# Patient Record
Sex: FEMALE | Race: WHITE | NOT HISPANIC OR LATINO | Employment: FULL TIME | URBAN - METROPOLITAN AREA
[De-identification: names, ages, dates, MRNs, and addresses within clinical notes are randomized per-mention and may not be internally consistent; named-entity substitution may affect disease eponyms.]

---

## 2019-12-29 ENCOUNTER — APPOINTMENT (OUTPATIENT)
Dept: CT IMAGING | Facility: CLINIC | Age: 24
DRG: 189 | End: 2019-12-29
Attending: INTERNAL MEDICINE
Payer: COMMERCIAL

## 2019-12-29 ENCOUNTER — APPOINTMENT (OUTPATIENT)
Dept: GENERAL RADIOLOGY | Facility: CLINIC | Age: 24
DRG: 189 | End: 2019-12-29
Attending: INTERNAL MEDICINE
Payer: COMMERCIAL

## 2019-12-29 ENCOUNTER — HOSPITAL ENCOUNTER (INPATIENT)
Facility: CLINIC | Age: 24
LOS: 1 days | Discharge: HOME | DRG: 189 | End: 2020-01-01
Attending: INTERNAL MEDICINE | Admitting: EMERGENCY MEDICINE
Payer: COMMERCIAL

## 2019-12-29 DIAGNOSIS — J96.01 ACUTE RESPIRATORY FAILURE WITH HYPOXIA (H): Primary | ICD-10-CM

## 2019-12-29 DIAGNOSIS — J21.0 RSV BRONCHIOLITIS: ICD-10-CM

## 2019-12-29 LAB
ANION GAP SERPL CALCULATED.3IONS-SCNC: 3 MMOL/L (ref 3–14)
BASOPHILS # BLD AUTO: 0.1 10E9/L (ref 0–0.2)
BASOPHILS NFR BLD AUTO: 0.6 %
BUN SERPL-MCNC: 10 MG/DL (ref 7–30)
CALCIUM SERPL-MCNC: 8.8 MG/DL (ref 8.5–10.1)
CHLORIDE SERPL-SCNC: 107 MMOL/L (ref 94–109)
CO2 SERPL-SCNC: 27 MMOL/L (ref 20–32)
CREAT SERPL-MCNC: 0.67 MG/DL (ref 0.52–1.04)
CRP SERPL-MCNC: 85 MG/L (ref 0–8)
D DIMER PPP FEU-MCNC: 1.2 UG/ML FEU (ref 0–0.5)
DIFFERENTIAL METHOD BLD: ABNORMAL
DIFFERENTIAL METHOD BLD: NORMAL
EOSINOPHIL # BLD AUTO: 0.4 10E9/L (ref 0–0.7)
EOSINOPHIL NFR BLD AUTO: 4 %
ERYTHROCYTE [DISTWIDTH] IN BLOOD BY AUTOMATED COUNT: 14.5 % (ref 10–15)
ERYTHROCYTE [DISTWIDTH] IN BLOOD BY AUTOMATED COUNT: NORMAL % (ref 10–15)
FLUAV+FLUBV RNA SPEC QL NAA+PROBE: NEGATIVE
FLUAV+FLUBV RNA SPEC QL NAA+PROBE: NEGATIVE
GFR SERPL CREATININE-BSD FRML MDRD: >90 ML/MIN/{1.73_M2}
GLUCOSE SERPL-MCNC: 75 MG/DL (ref 70–99)
HCG SERPL QL: NEGATIVE
HCT VFR BLD AUTO: 35.1 % (ref 35–47)
HCT VFR BLD AUTO: NORMAL % (ref 35–47)
HGB BLD-MCNC: 10.8 G/DL (ref 11.7–15.7)
HGB BLD-MCNC: NORMAL G/DL (ref 11.7–15.7)
IMM GRANULOCYTES # BLD: 0 10E9/L (ref 0–0.4)
IMM GRANULOCYTES NFR BLD: 0.3 %
LACTATE BLD-SCNC: 1.1 MMOL/L (ref 0.7–2)
LYMPHOCYTES # BLD AUTO: 2.8 10E9/L (ref 0.8–5.3)
LYMPHOCYTES NFR BLD AUTO: 27.3 %
MCH RBC QN AUTO: 26.3 PG (ref 26.5–33)
MCH RBC QN AUTO: NORMAL PG (ref 26.5–33)
MCHC RBC AUTO-ENTMCNC: 30.8 G/DL (ref 31.5–36.5)
MCHC RBC AUTO-ENTMCNC: NORMAL G/DL (ref 31.5–36.5)
MCV RBC AUTO: 85 FL (ref 78–100)
MCV RBC AUTO: NORMAL FL (ref 78–100)
MONOCYTES # BLD AUTO: 0.8 10E9/L (ref 0–1.3)
MONOCYTES NFR BLD AUTO: 8 %
NEUTROPHILS # BLD AUTO: 6 10E9/L (ref 1.6–8.3)
NEUTROPHILS NFR BLD AUTO: 59.8 %
NRBC # BLD AUTO: 0 10*3/UL
NRBC BLD AUTO-RTO: 0 /100
PLATELET # BLD AUTO: 226 10E9/L (ref 150–450)
PLATELET # BLD AUTO: NORMAL 10E9/L (ref 150–450)
POTASSIUM SERPL-SCNC: 4.2 MMOL/L (ref 3.4–5.3)
RBC # BLD AUTO: 4.11 10E12/L (ref 3.8–5.2)
RBC # BLD AUTO: NORMAL 10E12/L (ref 3.8–5.2)
RSV RNA SPEC NAA+PROBE: POSITIVE
SODIUM SERPL-SCNC: 137 MMOL/L (ref 133–144)
SPECIMEN SOURCE: ABNORMAL
WBC # BLD AUTO: 10.1 10E9/L (ref 4–11)
WBC # BLD AUTO: NORMAL 10E9/L (ref 4–11)

## 2019-12-29 PROCEDURE — 94640 AIRWAY INHALATION TREATMENT: CPT

## 2019-12-29 PROCEDURE — 83605 ASSAY OF LACTIC ACID: CPT | Performed by: PHYSICIAN ASSISTANT

## 2019-12-29 PROCEDURE — 25000128 H RX IP 250 OP 636: Performed by: INTERNAL MEDICINE

## 2019-12-29 PROCEDURE — 40000141 ZZH STATISTIC PERIPHERAL IV START W/O US GUIDANCE

## 2019-12-29 PROCEDURE — 25000132 ZZH RX MED GY IP 250 OP 250 PS 637: Performed by: PHYSICIAN ASSISTANT

## 2019-12-29 PROCEDURE — 99220 ZZC INITIAL OBSERVATION CARE,LEVL III: CPT | Mod: Z6 | Performed by: PHYSICIAN ASSISTANT

## 2019-12-29 PROCEDURE — 94640 AIRWAY INHALATION TREATMENT: CPT | Performed by: INTERNAL MEDICINE

## 2019-12-29 PROCEDURE — 40000275 ZZH STATISTIC RCP TIME EA 10 MIN

## 2019-12-29 PROCEDURE — 36415 COLL VENOUS BLD VENIPUNCTURE: CPT | Performed by: PHYSICIAN ASSISTANT

## 2019-12-29 PROCEDURE — 96375 TX/PRO/DX INJ NEW DRUG ADDON: CPT

## 2019-12-29 PROCEDURE — 87040 BLOOD CULTURE FOR BACTERIA: CPT | Performed by: INTERNAL MEDICINE

## 2019-12-29 PROCEDURE — 71275 CT ANGIOGRAPHY CHEST: CPT

## 2019-12-29 PROCEDURE — 25000128 H RX IP 250 OP 636: Performed by: PHYSICIAN ASSISTANT

## 2019-12-29 PROCEDURE — 96374 THER/PROPH/DIAG INJ IV PUSH: CPT | Mod: 59 | Performed by: INTERNAL MEDICINE

## 2019-12-29 PROCEDURE — 80048 BASIC METABOLIC PNL TOTAL CA: CPT | Performed by: INTERNAL MEDICINE

## 2019-12-29 PROCEDURE — 25000125 ZZHC RX 250: Performed by: PHYSICIAN ASSISTANT

## 2019-12-29 PROCEDURE — 25000125 ZZHC RX 250: Performed by: INTERNAL MEDICINE

## 2019-12-29 PROCEDURE — 71046 X-RAY EXAM CHEST 2 VIEWS: CPT

## 2019-12-29 PROCEDURE — 86140 C-REACTIVE PROTEIN: CPT | Performed by: INTERNAL MEDICINE

## 2019-12-29 PROCEDURE — 25000128 H RX IP 250 OP 636: Performed by: STUDENT IN AN ORGANIZED HEALTH CARE EDUCATION/TRAINING PROGRAM

## 2019-12-29 PROCEDURE — 87631 RESP VIRUS 3-5 TARGETS: CPT | Performed by: INTERNAL MEDICINE

## 2019-12-29 PROCEDURE — 85025 COMPLETE CBC W/AUTO DIFF WBC: CPT | Performed by: INTERNAL MEDICINE

## 2019-12-29 PROCEDURE — 85379 FIBRIN DEGRADATION QUANT: CPT | Performed by: INTERNAL MEDICINE

## 2019-12-29 PROCEDURE — 84703 CHORIONIC GONADOTROPIN ASSAY: CPT | Performed by: INTERNAL MEDICINE

## 2019-12-29 PROCEDURE — G0378 HOSPITAL OBSERVATION PER HR: HCPCS

## 2019-12-29 PROCEDURE — 25800030 ZZH RX IP 258 OP 636: Performed by: PHYSICIAN ASSISTANT

## 2019-12-29 PROCEDURE — 99285 EMERGENCY DEPT VISIT HI MDM: CPT | Mod: 25 | Performed by: INTERNAL MEDICINE

## 2019-12-29 RX ORDER — ALBUTEROL SULFATE 0.83 MG/ML
2.5 SOLUTION RESPIRATORY (INHALATION)
Status: DISCONTINUED | OUTPATIENT
Start: 2019-12-29 | End: 2019-12-30

## 2019-12-29 RX ORDER — IPRATROPIUM BROMIDE AND ALBUTEROL SULFATE 2.5; .5 MG/3ML; MG/3ML
3 SOLUTION RESPIRATORY (INHALATION) EVERY 4 HOURS
Status: DISCONTINUED | OUTPATIENT
Start: 2019-12-29 | End: 2019-12-30

## 2019-12-29 RX ORDER — ACETYLCYSTEINE 100 MG/ML
4 SOLUTION ORAL; RESPIRATORY (INHALATION) EVERY 4 HOURS
Status: DISCONTINUED | OUTPATIENT
Start: 2019-12-29 | End: 2019-12-30

## 2019-12-29 RX ORDER — METHYLPREDNISOLONE SODIUM SUCCINATE 125 MG/2ML
60 INJECTION, POWDER, LYOPHILIZED, FOR SOLUTION INTRAMUSCULAR; INTRAVENOUS ONCE
Status: DISCONTINUED | OUTPATIENT
Start: 2019-12-30 | End: 2019-12-29

## 2019-12-29 RX ORDER — CEFTRIAXONE 1 G/1
1 INJECTION, POWDER, FOR SOLUTION INTRAMUSCULAR; INTRAVENOUS EVERY 24 HOURS
Status: DISCONTINUED | OUTPATIENT
Start: 2019-12-29 | End: 2019-12-30

## 2019-12-29 RX ORDER — METHYLPREDNISOLONE SODIUM SUCCINATE 125 MG/2ML
40 INJECTION, POWDER, LYOPHILIZED, FOR SOLUTION INTRAMUSCULAR; INTRAVENOUS ONCE
Status: COMPLETED | OUTPATIENT
Start: 2019-12-29 | End: 2019-12-29

## 2019-12-29 RX ORDER — IOPAMIDOL 755 MG/ML
77 INJECTION, SOLUTION INTRAVASCULAR ONCE
Status: COMPLETED | OUTPATIENT
Start: 2019-12-29 | End: 2019-12-29

## 2019-12-29 RX ORDER — PREDNISONE 20 MG/1
40 TABLET ORAL DAILY
Status: DISCONTINUED | OUTPATIENT
Start: 2019-12-30 | End: 2020-01-01 | Stop reason: HOSPADM

## 2019-12-29 RX ORDER — AZITHROMYCIN 250 MG/1
500 TABLET, FILM COATED ORAL ONCE
Status: COMPLETED | OUTPATIENT
Start: 2019-12-29 | End: 2019-12-29

## 2019-12-29 RX ORDER — AZITHROMYCIN 250 MG/1
250 TABLET, FILM COATED ORAL DAILY
Status: DISCONTINUED | OUTPATIENT
Start: 2019-12-30 | End: 2019-12-30

## 2019-12-29 RX ORDER — ACETAMINOPHEN 325 MG/1
650 TABLET ORAL EVERY 4 HOURS PRN
Status: DISCONTINUED | OUTPATIENT
Start: 2019-12-29 | End: 2020-01-01 | Stop reason: HOSPADM

## 2019-12-29 RX ORDER — ONDANSETRON 4 MG/1
4 TABLET, ORALLY DISINTEGRATING ORAL EVERY 6 HOURS PRN
Status: DISCONTINUED | OUTPATIENT
Start: 2019-12-29 | End: 2020-01-01 | Stop reason: HOSPADM

## 2019-12-29 RX ORDER — METHYLPREDNISOLONE SODIUM SUCCINATE 125 MG/2ML
80 INJECTION, POWDER, LYOPHILIZED, FOR SOLUTION INTRAMUSCULAR; INTRAVENOUS ONCE
Status: DISCONTINUED | OUTPATIENT
Start: 2019-12-29 | End: 2019-12-29

## 2019-12-29 RX ORDER — ONDANSETRON 2 MG/ML
4 INJECTION INTRAMUSCULAR; INTRAVENOUS EVERY 6 HOURS PRN
Status: DISCONTINUED | OUTPATIENT
Start: 2019-12-29 | End: 2020-01-01 | Stop reason: HOSPADM

## 2019-12-29 RX ORDER — SODIUM CHLORIDE 9 MG/ML
INJECTION, SOLUTION INTRAVENOUS CONTINUOUS
Status: DISCONTINUED | OUTPATIENT
Start: 2019-12-29 | End: 2019-12-31

## 2019-12-29 RX ORDER — IPRATROPIUM BROMIDE AND ALBUTEROL SULFATE 2.5; .5 MG/3ML; MG/3ML
3 SOLUTION RESPIRATORY (INHALATION) ONCE
Status: COMPLETED | OUTPATIENT
Start: 2019-12-29 | End: 2019-12-29

## 2019-12-29 RX ORDER — NALOXONE HYDROCHLORIDE 0.4 MG/ML
.1-.4 INJECTION, SOLUTION INTRAMUSCULAR; INTRAVENOUS; SUBCUTANEOUS
Status: DISCONTINUED | OUTPATIENT
Start: 2019-12-29 | End: 2020-01-01 | Stop reason: HOSPADM

## 2019-12-29 RX ORDER — METHYLPREDNISOLONE SODIUM SUCCINATE 125 MG/2ML
60 INJECTION, POWDER, LYOPHILIZED, FOR SOLUTION INTRAMUSCULAR; INTRAVENOUS ONCE
Status: COMPLETED | OUTPATIENT
Start: 2019-12-30 | End: 2019-12-30

## 2019-12-29 RX ADMIN — IPRATROPIUM BROMIDE AND ALBUTEROL SULFATE 3 ML: .5; 3 SOLUTION RESPIRATORY (INHALATION) at 18:47

## 2019-12-29 RX ADMIN — SODIUM CHLORIDE: 9 INJECTION, SOLUTION INTRAVENOUS at 22:20

## 2019-12-29 RX ADMIN — IOPAMIDOL 77 ML: 755 INJECTION, SOLUTION INTRAVENOUS at 21:23

## 2019-12-29 RX ADMIN — METHYLPREDNISOLONE SODIUM SUCCINATE 37.5 MG: 125 INJECTION, POWDER, FOR SOLUTION INTRAMUSCULAR; INTRAVENOUS at 21:06

## 2019-12-29 RX ADMIN — IPRATROPIUM BROMIDE AND ALBUTEROL SULFATE 3 ML: .5; 3 SOLUTION RESPIRATORY (INHALATION) at 23:28

## 2019-12-29 RX ADMIN — CEFTRIAXONE 1 G: 1 INJECTION, POWDER, FOR SOLUTION INTRAMUSCULAR; INTRAVENOUS at 23:39

## 2019-12-29 RX ADMIN — AZITHROMYCIN MONOHYDRATE 500 MG: 250 TABLET ORAL at 23:39

## 2019-12-29 ASSESSMENT — ENCOUNTER SYMPTOMS
WHEEZING: 1
TROUBLE SWALLOWING: 0
HEADACHES: 0
SHORTNESS OF BREATH: 1
ABDOMINAL PAIN: 0
ADENOPATHY: 0
NUMBNESS: 0
DYSURIA: 0
CHILLS: 0
VOMITING: 0
NECK PAIN: 0
NAUSEA: 0
CONFUSION: 0
PALPITATIONS: 0
WEAKNESS: 0
BACK PAIN: 0
FEVER: 1
COUGH: 1
LIGHT-HEADEDNESS: 0

## 2019-12-29 ASSESSMENT — MIFFLIN-ST. JEOR: SCORE: 2093.53

## 2019-12-29 NOTE — ED TRIAGE NOTES
Patient presents to triage c/o cough and shortness of breath x 2 days. She reports fever of 101 last night. Oxygen saturation 92-95% on room air at rest, but with ambulated to triage from the lobby, oxygen dropped to 85%. Denies any sick contacts. Patient placed on 3L NC in ED lobby.

## 2019-12-29 NOTE — LETTER
January 1, 2020      To Whom It May Concern:      Alcira Moe was seen at [facility] today, 12/29/19 thru 01/01/20.  I expect her condition to improve over the next 5-7 days.  She may return to work when improved. Ideally 1/7/19 after clinic follow up and with reduction in strenuous activity until fully recovered.    Sincerely,        Nathan Welch MD

## 2019-12-29 NOTE — ED PROVIDER NOTES
"  History     Chief Complaint   Patient presents with     Shortness of Breath     HPI  Alcira Moe is a 24 year old female who presents with difficulty breathing today. She had a fever last night. She has minimally productive cough. She has been wheezing today. She has no nasal congestion. She has no sore throat. Cough is hacking to point of gagging at times. She has no nausea, vomiting or abdominal pain. She has no leg pain or swelling.    History reviewed. No pertinent past medical history.    History reviewed. No pertinent surgical history.    No family history on file.    Social History     Tobacco Use     Smoking status: Not on file   Substance Use Topics     Alcohol use: Not on file         I have reviewed the Medications, Allergies, Past Medical and Surgical History, and Social History in the Epic system.    Review of Systems   Constitutional: Positive for fever. Negative for chills.   HENT: Negative for congestion and trouble swallowing.    Eyes: Negative for visual disturbance.   Respiratory: Positive for cough, shortness of breath and wheezing.    Cardiovascular: Negative for chest pain, palpitations and leg swelling.   Gastrointestinal: Negative for abdominal pain, nausea and vomiting.   Genitourinary: Negative for dysuria.   Musculoskeletal: Negative for back pain and neck pain.   Skin: Negative for rash.   Neurological: Negative for weakness, light-headedness, numbness and headaches.   Hematological: Negative for adenopathy.   Psychiatric/Behavioral: Negative for confusion.       Physical Exam   BP: (!) 148/58  Pulse: 100  Temp: 98.6  F (37  C)  Resp: 20  Height: 154.9 cm (5' 1\")  Weight: 140.6 kg (310 lb)  SpO2: 92 %      Physical Exam  Vitals signs and nursing note reviewed.   Constitutional:       Appearance: She is well-developed.   HENT:      Head: Normocephalic and atraumatic.      Right Ear: External ear normal.      Left Ear: External ear normal.      Nose: Nose normal.      Mouth/Throat: "      Mouth: Mucous membranes are moist.   Eyes:      Extraocular Movements: Extraocular movements intact.      Pupils: Pupils are equal, round, and reactive to light.   Neck:      Musculoskeletal: Normal range of motion and neck supple.   Cardiovascular:      Rate and Rhythm: Normal rate and regular rhythm.      Pulses: Normal pulses.      Heart sounds: Normal heart sounds.   Pulmonary:      Effort: Respiratory distress present.      Breath sounds: Wheezing present. No rales.   Abdominal:      General: Abdomen is flat. There is distension (obese).      Palpations: Abdomen is soft.      Tenderness: There is no abdominal tenderness.   Musculoskeletal:      Right lower leg: No edema.      Left lower leg: No edema.   Skin:     General: Skin is warm and dry.   Neurological:      General: No focal deficit present.      Mental Status: She is alert and oriented to person, place, and time.   Psychiatric:         Mood and Affect: Mood normal.         Behavior: Behavior normal.         ED Course        Procedures        Labs/Imaging    Results for orders placed or performed during the hospital encounter of 12/29/19 (from the past 24 hour(s))   XR Chest 2 Views    Narrative    Exam: XR CHEST 2 VW, 12/29/2019 5:01 PM    Indication: dysonea, cough, fever    Comparison: None    Findings:   Cardiac silhouette is borderline enlarged. Cephalization. No  interstitial changes. Subtle opacity at the right lung base with  elevated right hemidiaphragm. Subtle tiny right pleural effusion.      Impression    Impression: Findings suggest mild pulmonary venous congestion. Likely  right lower lobe atelectasis. Can't exclude early pneumonia.    MANNY CLIFTON MD   Influenza A and B and RSV PCR   Result Value Ref Range    Specimen Description Nasopharyngeal     Influenza A PCR Negative NEG^Negative    Influenza B PCR Negative NEG^Negative    Resp Syncytial Virus Positive (A) NEG^Negative   CBC with platelets differential   Result Value Ref Range     WBC Canceled, Test credited 4.0 - 11.0 10e9/L    RBC Count Canceled, Test credited 3.8 - 5.2 10e12/L    Hemoglobin Canceled, Test credited 11.7 - 15.7 g/dL    Hematocrit Canceled, Test credited 35.0 - 47.0 %    MCV Canceled, Test credited 78 - 100 fl    MCH Canceled, Test credited 26.5 - 33.0 pg    MCHC Canceled, Test credited 31.5 - 36.5 g/dL    RDW Canceled, Test credited 10.0 - 15.0 %    Platelet Count Canceled, Test credited 150 - 450 10e9/L    Diff Method Canceled, Test credited    Basic metabolic panel   Result Value Ref Range    Sodium 137 133 - 144 mmol/L    Potassium 4.2 3.4 - 5.3 mmol/L    Chloride 107 94 - 109 mmol/L    Carbon Dioxide 27 20 - 32 mmol/L    Anion Gap 3 3 - 14 mmol/L    Glucose 75 70 - 99 mg/dL    Urea Nitrogen 10 7 - 30 mg/dL    Creatinine 0.67 0.52 - 1.04 mg/dL    GFR Estimate >90 >60 mL/min/[1.73_m2]    GFR Estimate If Black >90 >60 mL/min/[1.73_m2]    Calcium 8.8 8.5 - 10.1 mg/dL   CRP inflammation   Result Value Ref Range    CRP Inflammation 85.0 (H) 0.0 - 8.0 mg/L   HCG qualitative   Result Value Ref Range    HCG Qualitative Serum Negative NEG^Negative   Blood culture   Result Value Ref Range    Specimen Description Blood Left Arm     Special Requests Received in aerobic bottle only     Culture Micro PENDING    CBC with platelets differential   Result Value Ref Range    WBC 10.1 4.0 - 11.0 10e9/L    RBC Count 4.11 3.8 - 5.2 10e12/L    Hemoglobin 10.8 (L) 11.7 - 15.7 g/dL    Hematocrit 35.1 35.0 - 47.0 %    MCV 85 78 - 100 fl    MCH 26.3 (L) 26.5 - 33.0 pg    MCHC 30.8 (L) 31.5 - 36.5 g/dL    RDW 14.5 10.0 - 15.0 %    Platelet Count 226 150 - 450 10e9/L    Diff Method Automated Method     % Neutrophils 59.8 %    % Lymphocytes 27.3 %    % Monocytes 8.0 %    % Eosinophils 4.0 %    % Basophils 0.6 %    % Immature Granulocytes 0.3 %    Nucleated RBCs 0 0 /100    Absolute Neutrophil 6.0 1.6 - 8.3 10e9/L    Absolute Lymphocytes 2.8 0.8 - 5.3 10e9/L    Absolute Monocytes 0.8 0.0 - 1.3  10e9/L    Absolute Eosinophils 0.4 0.0 - 0.7 10e9/L    Absolute Basophils 0.1 0.0 - 0.2 10e9/L    Abs Immature Granulocytes 0.0 0 - 0.4 10e9/L    Absolute Nucleated RBC 0.0    D dimer quantitative   Result Value Ref Range    D Dimer 1.2 (H) 0.0 - 0.50 ug/ml FEU   CT Chest Pulmonary Embolism w Contrast    Narrative    CT CHEST PULMONARY EMBOLISM W CONTRAST, 12/29/2019 9:34 PM    History: dyspnea, elevated d-dimer    Comparison: Same-day chest radiograph    Technique: Helical acquisition of CT images of the chest from the lung  apices to the kidneys were acquired after the administration of  intravenous contrast according to the CT pulmonary angiogram protocol.  Axial images were reconstructed in 1 and 3 mm slice thickness. Coronal  reconstructions performed. Three-dimensional (3D) post-processed  angiographic images were reconstructed, archived to PACS and used in  the interpretation of this study    Findings:     The contrast bolus is adequate.  There is no pulmonary embolism.    Lung Parenchyma:  The central tracheobronchial tree is patent. No pneumothorax or  pleural effusion. Diffuse patchy groundglass and nodular opacities in  a peribronchovascular distribution, greatest in the right upper lobe.  No focal airspace consolidation.    Mediastinum:  Visualized portions of the thyroid gland are unremarkable in  appearance. The heart is normal in size, without pericardial effusion.  The thoracic vessels are normal in caliber. Common origin of the right  brachiocephalic and left common carotid arteries, normal anatomic  variant. Enlarged mediastinal and hilar lymph nodes, for example a  left prevascular lymph node measuring 1.1 cm in short axis dimension  (series 4, image 27). Bilateral hilar adenopathy, right greater than  left, with enlarged right hilar lymph node measuring 1.6 cm in short  axis dimension (series 9, image 111). No axillary lymphadenopathy. The  esophagus is unremarkable in appearance.    Upper  abdomen:  Limited evaluation of the upper abdomen by contrast, bolus timing and  coverage. Upper abdomen is unremarkable.    Bones and soft tissues:  No acute or suspicious osseous findings.      Impression    Impression:  1. No pulmonary embolism identified.  2. Diffuse groundglass and nodular opacities scattered throughout the  lungs in a peribronchovascular distribution, greatest in the right  upper lobe, with prominent mediastinal and hilar adenopathy.  Differential considerations include infection, including atypical  sources of infection, and pulmonary sarcoidosis.   Lactic acid level STAT   Result Value Ref Range    Lactate for Sepsis Protocol 1.1 0.7 - 2.0 mmol/L         Assessments & Plan (with Medical Decision Making)   Impression:  Young female with morbid obesity presents with lower respiratory symptoms with wheezing and hypoxemia. She has subtle infiltrate in the right base on CXR. Oxygen saturation low 90s on room air dropping to high 80s with walking. She has negative influenza antigen but positive RSV PCR. She has significant wheezing on exam. At this point I would like to admit her to the ED observation unit for treatment with supportive care with steroids, nebulized bronchodilator and oxygen support. As she is generally healthy she likely will be able to be discharged to home care in 1-2 days once wheezing and oxygenation are improved. She should be treated as per acute asthma exacerbation.    I have reviewed the nursing notes.    I have reviewed the findings, diagnosis, plan and need for follow up with the patient.    There are no discharge medications for this patient.      Final diagnoses:   RSV bronchiolitis       12/29/2019   [facility], [place], EMERGENCY DEPARTMENT     Inocente Kat MD  12/29/19 [id number]

## 2019-12-30 LAB
ANION GAP SERPL CALCULATED.3IONS-SCNC: 9 MMOL/L (ref 3–14)
BUN SERPL-MCNC: 10 MG/DL (ref 7–30)
CALCIUM SERPL-MCNC: 8.6 MG/DL (ref 8.5–10.1)
CHLORIDE SERPL-SCNC: 108 MMOL/L (ref 94–109)
CO2 SERPL-SCNC: 21 MMOL/L (ref 20–32)
CREAT SERPL-MCNC: 0.6 MG/DL (ref 0.52–1.04)
ERYTHROCYTE [DISTWIDTH] IN BLOOD BY AUTOMATED COUNT: 14.6 % (ref 10–15)
GFR SERPL CREATININE-BSD FRML MDRD: >90 ML/MIN/{1.73_M2}
GLUCOSE SERPL-MCNC: 142 MG/DL (ref 70–99)
GRAM STN SPEC: NORMAL
HCT VFR BLD AUTO: 36.5 % (ref 35–47)
HGB BLD-MCNC: 11.2 G/DL (ref 11.7–15.7)
LACTATE BLD-SCNC: 3.1 MMOL/L (ref 0.7–2)
Lab: NORMAL
MCH RBC QN AUTO: 26.3 PG (ref 26.5–33)
MCHC RBC AUTO-ENTMCNC: 30.7 G/DL (ref 31.5–36.5)
MCV RBC AUTO: 86 FL (ref 78–100)
PLATELET # BLD AUTO: 233 10E9/L (ref 150–450)
POTASSIUM SERPL-SCNC: 4.2 MMOL/L (ref 3.4–5.3)
RBC # BLD AUTO: 4.26 10E12/L (ref 3.8–5.2)
SODIUM SERPL-SCNC: 138 MMOL/L (ref 133–144)
SPECIMEN SOURCE: NORMAL
WBC # BLD AUTO: 9.8 10E9/L (ref 4–11)

## 2019-12-30 PROCEDURE — 40000275 ZZH STATISTIC RCP TIME EA 10 MIN

## 2019-12-30 PROCEDURE — 87070 CULTURE OTHR SPECIMN AEROBIC: CPT | Performed by: NURSE PRACTITIONER

## 2019-12-30 PROCEDURE — 25000125 ZZHC RX 250: Performed by: PHYSICIAN ASSISTANT

## 2019-12-30 PROCEDURE — 83605 ASSAY OF LACTIC ACID: CPT | Performed by: EMERGENCY MEDICINE

## 2019-12-30 PROCEDURE — 80048 BASIC METABOLIC PNL TOTAL CA: CPT | Performed by: PHYSICIAN ASSISTANT

## 2019-12-30 PROCEDURE — 25000132 ZZH RX MED GY IP 250 OP 250 PS 637: Performed by: PHYSICIAN ASSISTANT

## 2019-12-30 PROCEDURE — 25800030 ZZH RX IP 258 OP 636: Performed by: PHYSICIAN ASSISTANT

## 2019-12-30 PROCEDURE — 85027 COMPLETE CBC AUTOMATED: CPT | Performed by: PHYSICIAN ASSISTANT

## 2019-12-30 PROCEDURE — 25000131 ZZH RX MED GY IP 250 OP 636 PS 637: Performed by: PHYSICIAN ASSISTANT

## 2019-12-30 PROCEDURE — 36415 COLL VENOUS BLD VENIPUNCTURE: CPT | Performed by: PHYSICIAN ASSISTANT

## 2019-12-30 PROCEDURE — 99225 ZZC SUBSEQUENT OBSERVATION CARE,LEVEL II: CPT | Mod: Z6 | Performed by: EMERGENCY MEDICINE

## 2019-12-30 PROCEDURE — 25000125 ZZHC RX 250: Performed by: NURSE PRACTITIONER

## 2019-12-30 PROCEDURE — 36415 COLL VENOUS BLD VENIPUNCTURE: CPT | Performed by: EMERGENCY MEDICINE

## 2019-12-30 PROCEDURE — 96376 TX/PRO/DX INJ SAME DRUG ADON: CPT

## 2019-12-30 PROCEDURE — 25000128 H RX IP 250 OP 636: Performed by: PHYSICIAN ASSISTANT

## 2019-12-30 PROCEDURE — G0378 HOSPITAL OBSERVATION PER HR: HCPCS

## 2019-12-30 PROCEDURE — 87205 SMEAR GRAM STAIN: CPT | Performed by: NURSE PRACTITIONER

## 2019-12-30 PROCEDURE — 94640 AIRWAY INHALATION TREATMENT: CPT | Mod: 76

## 2019-12-30 RX ORDER — IPRATROPIUM BROMIDE AND ALBUTEROL SULFATE 2.5; .5 MG/3ML; MG/3ML
3 SOLUTION RESPIRATORY (INHALATION)
Status: DISCONTINUED | OUTPATIENT
Start: 2019-12-30 | End: 2019-12-31

## 2019-12-30 RX ORDER — ALBUTEROL SULFATE 0.83 MG/ML
2.5 SOLUTION RESPIRATORY (INHALATION)
Status: DISCONTINUED | OUTPATIENT
Start: 2019-12-30 | End: 2020-01-01 | Stop reason: HOSPADM

## 2019-12-30 RX ADMIN — IPRATROPIUM BROMIDE AND ALBUTEROL SULFATE 3 ML: .5; 3 SOLUTION RESPIRATORY (INHALATION) at 04:19

## 2019-12-30 RX ADMIN — ALBUTEROL SULFATE 2.5 MG: 2.5 SOLUTION RESPIRATORY (INHALATION) at 19:45

## 2019-12-30 RX ADMIN — SODIUM CHLORIDE: 9 INJECTION, SOLUTION INTRAVENOUS at 23:46

## 2019-12-30 RX ADMIN — PREDNISONE 40 MG: 20 TABLET ORAL at 07:51

## 2019-12-30 RX ADMIN — IPRATROPIUM BROMIDE AND ALBUTEROL SULFATE 3 ML: .5; 3 SOLUTION RESPIRATORY (INHALATION) at 15:49

## 2019-12-30 RX ADMIN — ACETYLCYSTEINE 4 ML: 100 INHALANT RESPIRATORY (INHALATION) at 04:19

## 2019-12-30 RX ADMIN — IPRATROPIUM BROMIDE AND ALBUTEROL SULFATE 3 ML: .5; 3 SOLUTION RESPIRATORY (INHALATION) at 11:18

## 2019-12-30 RX ADMIN — IPRATROPIUM BROMIDE AND ALBUTEROL SULFATE 3 ML: .5; 3 SOLUTION RESPIRATORY (INHALATION) at 07:18

## 2019-12-30 RX ADMIN — AZITHROMYCIN MONOHYDRATE 250 MG: 250 TABLET ORAL at 07:51

## 2019-12-30 RX ADMIN — METHYLPREDNISOLONE SODIUM SUCCINATE 62.5 MG: 125 INJECTION, POWDER, FOR SOLUTION INTRAMUSCULAR; INTRAVENOUS at 03:29

## 2019-12-30 NOTE — PROVIDER NOTIFICATION
Notified provider regarding increased sob.  RR 30.  o2 sats 90 on 3LNC.  RT paged for albuterol neb

## 2019-12-30 NOTE — PLAN OF CARE
Writer assumed cares from 11:00-15:30. Pt SOB and tachycardic throughout shift, sating well on 3L at rest. Expiratory wheezes in R lung. Pt keeps taking O2 off c/o feeling dry-humidity added to oxygen. Pt drinking lots of water. Up ambulating to bathroom independently. Dyspnea with activity. RT paged for PRN neb. Pt states she is a smoker. Cont to monitor.       Observation Goals:     - Diagnostic tests and consults completed and resulted: Not met, has not been seen by consults.    - Vital signs normal or at patient baseline: Not met, desat on RA, tachycardic.    - Infection is improving: Pending, monitoring.    - Dyspnea improved and O2 sats greater than 88% on room air or prior home oxygen levels: Not met,  < 88% on RA.    - Returns to baseline functional status: Not met, additional requirements.    - Safe disposition plan has been identified: Pending, no plan identified.

## 2019-12-30 NOTE — PLAN OF CARE
Observation Goals:  - Diagnostic tests and consults completed and resulted: Not met, has not been seen by consults.  - Vital signs normal or at patient baseline: Not met, desat on RA, tachycardic.  - Infection is improving: Pending, monitoring.  - Dyspnea improved and O2 sats greater than 88% on room air or prior home oxygen levels: Not met,  < 88% on RA.  - Returns to baseline functional status: Not met, additional requirements.  - Safe disposition plan has been identified: Pending, no plan identified.

## 2019-12-30 NOTE — PROGRESS NOTES
"Emergency Medicine Observation Attending note    The patient was independently seen and examined by me. The chart, vital signs, and labs were reviewed. The patient's findings were discussed with the NILO on the observation unit, and I agree with the findings of the note and the plan.    23 yo female, admitted to ED OBS after presenting with respiratory complaints, and was ultimately diagnosed with RSV. She c/o a productive cough with wheeze, though no sore throat or nasal congestion. No N/V or abd pain. CT chest showed not PE, though did show diffuse groundglass and nodular opacities scattered throughout the lungs in a peribronchovascular distribution, greatest in the right  upper lobe, with prominent mediastinal and hilar adenopathy.  Differential considerations include infection, including atypical  sources of infection, and pulmonary sarcoidosisShe was noted to drop her sats into the high 90s with walking, so was admitted for supportive cares as well as steroids and neb treatments. This morning she states that she's feeling better, though was noted to have sats of 88% while lying in bed as I entered the room.     /76 (BP Location: Left arm)   Pulse 107   Temp 97.9  F (36.6  C) (Oral)   Resp 22   Ht 1.549 m (5' 1\")   Wt 140.6 kg (310 lb)   SpO2 96%   BMI 58.57 kg/m      Exam:  General: awake, alert, NAD  HEENT: NC/AT  Neck: supple  Lungs: prolonged expiration with some exp wheeze in upper lobes bilaterally  Heart: mildly tachycardic, no M/R/G  Abd: soft, ND/NT  Ext: non-tender, no edema      Assessment/plan:  1. Respiratory infection - RSV positive. CT showed scattered ground glass opacities - discussed with Radiology today who agrees that the findings would be c/w an RSV type infection. ABX had been started overnight, but don't have high suspicion for bacterial infection at this point, so would stop for now. May need to transition to inpatient if continuing to be hypoxic later today. Will continue " steroids and nebs.

## 2019-12-30 NOTE — H&P
[facility] ED Observation Admission Note    Chief Complaint   Patient presents with     Shortness of Breath       Assessment/Plan:  Alcira Moe is a 24 year old female with no PMH who presents with difficulty breathing today.     1. RSV: present with SOB, dry cough, and wheezing x 2 day. Report coughing fits where she almost vomits. She did have an episode of fever last night before going to bed. Reports associated myalgia. Symptoms seem to be getting worse. Denies hx of asthma or pneumonia. No other pulmonary disease. Denies sick contacts. Denies sore throat, nausea, or vomiting. In the ED, T 98.6, P 100, /58, R 20, O2 90% RA. BMP unremarkable. CBC no leukocytosis. HGB 10.8. CR elevated at 85. D-dimer found to be elevated to 1.2. CT chest obtained, show diffuse groundglass and nodular opacities scattered throughout the lungs, greatest in the right upper lobe consistent with include infection, including atypical, but no PE.  Influenza PCR positive for resp syncytial virus. CXR, findings suggest mild pulmonary venous congestion. Likely right lower lobe atelectasis. Can't exclude early pneumonia. In the ED, oxygen saturation dropping to high 80s with walking. Pt is being admitted for supportive treatment with steroids, neb treatment, oxygen and antibiotics for potential pneumonia.   -Duobneb q4h  -Albuterol neb q2h prn  -Mucomyst neb q 4 hr prn  -Zithromax 500 mg x 1 and then 250 mg x 4 days   -Rocephin 1 g q 24 hr   -Continue Solumedrol 60 mg and transition to oral prednisone in the morning        HPI:    Alcira Moe is a 24 year old female who presents with difficulty breathing today. She had a fever last night. She has minimally productive cough. She has been wheezing today. She has no nasal congestion. She has no sore throat. Cough is hacking to point of gagging at times. She has no nausea, vomiting or abdominal pain. She has no leg pain or swelling.       In the ED, T 98.6, P 100, /58, R 20,  O2 90% RA. BMP unremarkable. CBC no leukocytosis. HGB 10.8. CR elevated at 85. D-dimer found to be elevated to 1.2. CT chest obtained, show diffuse groundglass and nodular opacities scattered throughout the lungs, greatest in the right upper lobe consistent with include infection, including atypical, but no PE.  Influenza PCR positive for resp syncytial virus. CXR, findings suggest mild pulmonary venous congestion. Likely right lower lobe atelectasis. Can't exclude early pneumonia. In the ED, oxygen saturation dropping to high 80s with walking. Pt is being admitted for supportive treatment with steroids, neb treatment, oxygen and antibiotics for potential pneumonia.     On admission to the observation unit the patient was stable    History:    History reviewed. No pertinent past medical history.    History reviewed. No pertinent surgical history.    No family history on file.    Social History     Socioeconomic History     Marital status: Single     Spouse name: Not on file     Number of children: Not on file     Years of education: Not on file     Highest education level: Not on file   Occupational History     Not on file   Social Needs     Financial resource strain: Not on file     Food insecurity:     Worry: Not on file     Inability: Not on file     Transportation needs:     Medical: Not on file     Non-medical: Not on file   Tobacco Use     Smoking status: Not on file   Substance and Sexual Activity     Alcohol use: Not on file     Drug use: Not on file     Sexual activity: Not on file   Lifestyle     Physical activity:     Days per week: Not on file     Minutes per session: Not on file     Stress: Not on file   Relationships     Social connections:     Talks on phone: Not on file     Gets together: Not on file     Attends Sikh service: Not on file     Active member of club or organization: Not on file     Attends meetings of clubs or organizations: Not on file     Relationship status: Not on file      Intimate partner violence:     Fear of current or ex partner: Not on file     Emotionally abused: Not on file     Physically abused: Not on file     Forced sexual activity: Not on file   Other Topics Concern     Not on file   Social History Narrative     Not on file       No current facility-administered medications on file prior to encounter.   No current outpatient medications on file prior to encounter.      Data:    Results for orders placed or performed during the hospital encounter of 12/29/19   CBC with platelets differential     Status: None   Result Value Ref Range    WBC Canceled, Test credited 4.0 - 11.0 10e9/L    RBC Count Canceled, Test credited 3.8 - 5.2 10e12/L    Hemoglobin Canceled, Test credited 11.7 - 15.7 g/dL    Hematocrit Canceled, Test credited 35.0 - 47.0 %    MCV Canceled, Test credited 78 - 100 fl    MCH Canceled, Test credited 26.5 - 33.0 pg    MCHC Canceled, Test credited 31.5 - 36.5 g/dL    RDW Canceled, Test credited 10.0 - 15.0 %    Platelet Count Canceled, Test credited 150 - 450 10e9/L    Diff Method Canceled, Test credited    Basic metabolic panel     Status: None   Result Value Ref Range    Sodium 137 133 - 144 mmol/L    Potassium 4.2 3.4 - 5.3 mmol/L    Chloride 107 94 - 109 mmol/L    Carbon Dioxide 27 20 - 32 mmol/L    Anion Gap 3 3 - 14 mmol/L    Glucose 75 70 - 99 mg/dL    Urea Nitrogen 10 7 - 30 mg/dL    Creatinine 0.67 0.52 - 1.04 mg/dL    GFR Estimate >90 >60 mL/min/[1.73_m2]    GFR Estimate If Black >90 >60 mL/min/[1.73_m2]    Calcium 8.8 8.5 - 10.1 mg/dL   CRP inflammation     Status: Abnormal   Result Value Ref Range    CRP Inflammation 85.0 (H) 0.0 - 8.0 mg/L   CBC with platelets differential     Status: Abnormal   Result Value Ref Range    WBC 10.1 4.0 - 11.0 10e9/L    RBC Count 4.11 3.8 - 5.2 10e12/L    Hemoglobin 10.8 (L) 11.7 - 15.7 g/dL    Hematocrit 35.1 35.0 - 47.0 %    MCV 85 78 - 100 fl    MCH 26.3 (L) 26.5 - 33.0 pg    MCHC 30.8 (L) 31.5 - 36.5 g/dL    RDW 14.5  10.0 - 15.0 %    Platelet Count 226 150 - 450 10e9/L    Diff Method Automated Method     % Neutrophils 59.8 %    % Lymphocytes 27.3 %    % Monocytes 8.0 %    % Eosinophils 4.0 %    % Basophils 0.6 %    % Immature Granulocytes 0.3 %    Nucleated RBCs 0 0 /100    Absolute Neutrophil 6.0 1.6 - 8.3 10e9/L    Absolute Lymphocytes 2.8 0.8 - 5.3 10e9/L    Absolute Monocytes 0.8 0.0 - 1.3 10e9/L    Absolute Eosinophils 0.4 0.0 - 0.7 10e9/L    Absolute Basophils 0.1 0.0 - 0.2 10e9/L    Abs Immature Granulocytes 0.0 0 - 0.4 10e9/L    Absolute Nucleated RBC 0.0    D dimer quantitative     Status: Abnormal   Result Value Ref Range    D Dimer 1.2 (H) 0.0 - 0.50 ug/ml FEU   HCG qualitative     Status: None   Result Value Ref Range    HCG Qualitative Serum Negative NEG^Negative   Lactic acid level STAT     Status: None   Result Value Ref Range    Lactate for Sepsis Protocol 1.1 0.7 - 2.0 mmol/L   Influenza A and B and RSV PCR     Status: Abnormal   Result Value Ref Range    Specimen Description Nasopharyngeal     Influenza A PCR Negative NEG^Negative    Influenza B PCR Negative NEG^Negative    Resp Syncytial Virus Positive (A) NEG^Negative   Blood culture     Status: None (Preliminary result)   Result Value Ref Range    Specimen Description Blood Left Arm     Special Requests Received in aerobic bottle only     Culture Micro PENDING              EKG Interpretation:         ROS:  REVIEW OF SYSTEMS:   General: Fever  EYES: Negative for vision changes or eye problems   ENT: No problems with ears, nose or throat. No difficulty swallowing.   RESP: Positive for coughing, wheezing or shortness of breath   CV: No chest pains or palpitations   GI: No nausea, vomiting, heartburn, abdominal pain, diarrhea, constipation or change in bowel habits   : No urinary frequency or dysuria, bladder or kidney problems   MUSCULOSKELETAL: No significant muscle or joint pains   NEUROLOGIC: No headaches, numbness, tingling, weakness, problems with  balance or coordination   PSYCHIATRIC: No problems with anxiety, depression or mental health   HEME/IMMUNE/ALLERGY: No history of bleeding or clotting problems or anemia. No allergies or immune system problems   ENDOCRINE: No history of thyroid disease, diabetes or other endocrine disorders   SKIN: No rashes,worrisome lesions or skin problems      PCP: Not on file   CARDIAC RISK:     10 point ROS negative other than the symptoms noted above.      Exam:    Vitals:  B/P: 140/99, T: 98.8, P: 111, R: 30  Physical Exam   Constitutional: Pt is oriented to person, place, and time.Pt appears well-developed and well-nourished.   HENT:   Head: Normocephalic and atraumatic.   Eyes: Conjunctivae are normal. Pupils are equal, round, and reactive to light.   Neck: Normal range of motion. Neck supple.   Cardiovascular: Normal rate, regular rhythm, normal heart sounds and intact distal pulses.    Pulmonary/Chest: Diffuse wheezing throughout the lungs. Effort normal. No respiratory distress. Pt has no rales  Abdominal: Soft. Bowel sounds are normal. Pt exhibits no distension and no mass. No tenderness. Pt has no rebound and no guarding.   Musculoskeletal: Normal range of motion. Pt exhibits no edema.   Neurological: Pt is alert and oriented to person, place, and time. Normal reflexes.   Skin: Skin is warm and dry. No rash noted.   Psychiatric: Pt has a normal mood and affect. Behavior is normal. Judgment and thought content normal.     Consults: None  FEN: Regular   DVT prophylaxis: Early ambulation  Code Status: Full  Disposition: Stable vital signs. Patient return to baseline.  All labs/images reviewed    Signed:  Gill Hoover PA-C  December 29, 2019 at 10:06 PM

## 2019-12-30 NOTE — PLAN OF CARE
"Observation Goals:     - Diagnostic tests and consults completed and resulted: Not met, has not been seen by consults.    - Vital signs normal or at patient baseline: Not met, desat on RA, tachycardic.    - Infection is improving: Pending, monitoring.    - Dyspnea improved and O2 sats greater than 88% on room air or prior home oxygen levels: Not met,  pt on currently on 3L.     - Returns to baseline functional status: Not met, additional requirements.    - Safe disposition plan has been identified: Pending, no plan identified.         /72   Pulse 105   Temp 97.5  F (36.4  C) (Oral)   Resp (!) 36   Ht 1.549 m (5' 1\")   Wt 140.6 kg (310 lb)   SpO2 94%   BMI 58.57 kg/m      "

## 2019-12-30 NOTE — PLAN OF CARE
- Diagnostic tests and consults completed and resulted: No   - Vital signs normal or at patient baseline: No, sats low at room air   - Infection is improving: Pending   - Dyspnea improved and O2 sats greater than 88% on room air or prior home oxygen levels: No  - Returns to baseline functional status: No   - Safe disposition plan has been identified: Pending

## 2019-12-30 NOTE — PROGRESS NOTES
[facility], [place]    Medicine Progress Note - Emergency Department Observation Unit       Date of Admission:  12/29/2019  Assessment & Plan    Alcira Moe is a 24 year old female with no PMH who presents with difficulty breathing today.      1. RSV: present with SOB, dry cough, and wheezing x 2 day. Report coughing fits where she almost vomits. She did have an episode of fever 12/28 before going to bed. Reports associated myalgia. Symptoms seem to be getting worse. Denies hx of asthma or pneumonia.  D-dimer found to be elevated to 1.2. CT chest obtained, show diffuse groundglass and nodular opacities scattered throughout the lungs, greatest in the right upper lobe . Discussed with radiology who reported findings are most likely viral.  Influenza PCR positive for resp syncytial virus. CXR, findings suggest mild pulmonary venous congestion. Likely right lower lobe atelectasis. She was noted to drop her sats into the high 90s with walking, so was admitted for supportive cares as well as steroids and neb treatments. This morning she states that she's feeling better, though was noted to have sats of 88% while lying in bed this am. Throughout the day, patient sating less than 88% on RA while in bed. Unable to complete a full sentence without shortness of breath. DIscussed with medicine who recommended pulmonology consult. Discussed with pulmonology who recommended: Duonebs QID, Albuterol neb q 1 hour prn, continue Prednisone for a total of 5 days, agreed with stopping antibiotics, sputum culture and wean O2.  Recommended transfer to medicine if no improvement in am  -Scheduled Duoneb QID  -Albuterol neb q1h prn  - prednisone 40 mg daily (for 5 days)  - Sputum culture  - Wean O2  - Pulmonology to see in am.       Diet: Regular Diet Adult    DVT Prophylaxis: Low Risk/Ambulatory with no VTE prophylaxis indicated  Mata Catheter: not present  Code Status: Full Code      Disposition Plan    Expected discharge: Tomorrow, recommended to prior living arrangement once improvement in respiratory status..  Entered: Renetta STEVENTIFFANY Hoffmann CNP 12/30/2019, 7:36 AM       The patient's care was discussed with the Attending Physician, Dr. Rodriguez, Bedside Nurse, Care Coordinator/ and Patient.    Renetta MAURIZIO TIFFANY Burleson, NP  Emergency Department  [phone] Ex [phone]  ______________________________________________________________________    Interval History   Sating 88% lying on stomach in bed.     Data reviewed today: I reviewed all medications, new labs and imaging results over the last 24 hours.     Physical Exam   Vital Signs: Temp: 99.3  F (37.4  C) Temp src: Oral BP: 124/56 Pulse: 89   Resp: 20 SpO2: 97 % O2 Device: None (Room air)    Weight: 310 lbs 0 oz  Constitutional: healthy, alert and no distress   Head: Normocephalic. No masses, lesions, tenderness or abnormalities   Neck: Neck supple. No adenopathy. Thyroid symmetric, normal size,, Carotids without bruits.   ENT: ENT exam normal, no neck nodes or sinus tenderness   Cardiovascular: RRR. No murmurs, clicks gallops or rub   Respiratory: Diffuse wheezing throughout.   Gastrointestinal: Abdomen soft,. BS normal. No masses, organomegaly.   : Deferred   Musculoskeletal: extremities normal- no gross deformities noted, gait normal and normal muscle tone   Skin: no suspicious lesions or rashes   Neurologic: Gait normal. Reflexes normal and symmetric. Sensation grossly WNL.   Psychiatric: mentation appears normal and affect normal/bright   Hematologic/Lymphatic/Immunologic: normal ant/post cervical, axillary, supraclavicular and inguinal     Data   Recent Results (from the past 24 hour(s))   CT Chest Pulmonary Embolism w Contrast    Narrative    CT CHEST PULMONARY EMBOLISM W CONTRAST, 12/29/2019 9:34 PM    History: dyspnea, elevated d-dimer    Comparison: Same-day chest radiograph    Technique: Helical acquisition of CT images of the chest from the  lung  apices to the kidneys were acquired after the administration of  intravenous contrast according to the CT pulmonary angiogram protocol.  Axial images were reconstructed in 1 and 3 mm slice thickness. Coronal  reconstructions performed. Three-dimensional (3D) post-processed  angiographic images were reconstructed, archived to PACS and used in  the interpretation of this study    Findings:     The contrast bolus is adequate.  There is no pulmonary embolism.    Lung Parenchyma:  The central tracheobronchial tree is patent. No pneumothorax or  pleural effusion. Diffuse patchy groundglass and nodular opacities in  a peribronchovascular distribution, greatest in the right upper lobe.  No focal airspace consolidation.    Mediastinum:  Visualized portions of the thyroid gland are unremarkable in  appearance. The heart is normal in size, without pericardial effusion.  The thoracic vessels are normal in caliber. Common origin of the right  brachiocephalic and left common carotid arteries, normal anatomic  variant. Enlarged mediastinal and hilar lymph nodes, for example a  left prevascular lymph node measuring 1.1 cm in short axis dimension  (series 4, image 27). Bilateral hilar adenopathy, right greater than  left, with enlarged right hilar lymph node measuring 1.6 cm in short  axis dimension (series 9, image 111). No axillary lymphadenopathy. The  esophagus is unremarkable in appearance.    Upper abdomen:  Limited evaluation of the upper abdomen by contrast, bolus timing and  coverage. Upper abdomen is unremarkable.    Bones and soft tissues:  No acute or suspicious osseous findings.      Impression    Impression:  1. No pulmonary embolism identified.  2. Diffuse groundglass and nodular opacities scattered throughout the  lungs in a peribronchovascular distribution, greatest in the right  upper lobe, with prominent mediastinal and hilar adenopathy.  Differential considerations include infection, including  atypical  sources of infection, and pulmonary sarcoidosis.    I have personally reviewed the examination and initial interpretation  and I agree with the findings.    MANNY CLIFTON MD

## 2019-12-31 PROBLEM — J96.01 ACUTE RESPIRATORY FAILURE WITH HYPOXIA (H): Status: ACTIVE | Noted: 2019-12-31

## 2019-12-31 LAB
ANION GAP SERPL CALCULATED.3IONS-SCNC: 5 MMOL/L (ref 3–14)
BUN SERPL-MCNC: 12 MG/DL (ref 7–30)
CALCIUM SERPL-MCNC: 8.2 MG/DL (ref 8.5–10.1)
CHLORIDE SERPL-SCNC: 111 MMOL/L (ref 94–109)
CO2 SERPL-SCNC: 25 MMOL/L (ref 20–32)
CREAT SERPL-MCNC: 0.68 MG/DL (ref 0.52–1.04)
ERYTHROCYTE [DISTWIDTH] IN BLOOD BY AUTOMATED COUNT: 14.7 % (ref 10–15)
FERRITIN SERPL-MCNC: 185 NG/ML (ref 12–150)
GFR SERPL CREATININE-BSD FRML MDRD: >90 ML/MIN/{1.73_M2}
GLUCOSE SERPL-MCNC: 119 MG/DL (ref 70–99)
HCT VFR BLD AUTO: 33.4 % (ref 35–47)
HGB BLD-MCNC: 10.3 G/DL (ref 11.7–15.7)
IRON SATN MFR SERPL: 7 % (ref 15–46)
IRON SERPL-MCNC: 20 UG/DL (ref 35–180)
LACTATE BLD-SCNC: 1.5 MMOL/L (ref 0.7–2)
MCH RBC QN AUTO: 26.3 PG (ref 26.5–33)
MCHC RBC AUTO-ENTMCNC: 30.8 G/DL (ref 31.5–36.5)
MCV RBC AUTO: 85 FL (ref 78–100)
PLATELET # BLD AUTO: 232 10E9/L (ref 150–450)
POTASSIUM SERPL-SCNC: 3.9 MMOL/L (ref 3.4–5.3)
RBC # BLD AUTO: 3.91 10E12/L (ref 3.8–5.2)
SODIUM SERPL-SCNC: 141 MMOL/L (ref 133–144)
TIBC SERPL-MCNC: 291 UG/DL (ref 240–430)
WBC # BLD AUTO: 17.7 10E9/L (ref 4–11)

## 2019-12-31 PROCEDURE — 25000132 ZZH RX MED GY IP 250 OP 250 PS 637: Performed by: PHYSICIAN ASSISTANT

## 2019-12-31 PROCEDURE — 36415 COLL VENOUS BLD VENIPUNCTURE: CPT | Performed by: NURSE PRACTITIONER

## 2019-12-31 PROCEDURE — 85027 COMPLETE CBC AUTOMATED: CPT | Performed by: NURSE PRACTITIONER

## 2019-12-31 PROCEDURE — 80048 BASIC METABOLIC PNL TOTAL CA: CPT | Performed by: NURSE PRACTITIONER

## 2019-12-31 PROCEDURE — 25000131 ZZH RX MED GY IP 250 OP 636 PS 637: Performed by: PHYSICIAN ASSISTANT

## 2019-12-31 PROCEDURE — 12000001 ZZH R&B MED SURG/OB UMMC

## 2019-12-31 PROCEDURE — 99233 SBSQ HOSP IP/OBS HIGH 50: CPT | Performed by: PEDIATRICS

## 2019-12-31 PROCEDURE — G0378 HOSPITAL OBSERVATION PER HR: HCPCS

## 2019-12-31 PROCEDURE — 25000132 ZZH RX MED GY IP 250 OP 250 PS 637: Performed by: PEDIATRICS

## 2019-12-31 PROCEDURE — 25000125 ZZHC RX 250: Performed by: NURSE PRACTITIONER

## 2019-12-31 PROCEDURE — 82728 ASSAY OF FERRITIN: CPT | Performed by: NURSE PRACTITIONER

## 2019-12-31 PROCEDURE — 83605 ASSAY OF LACTIC ACID: CPT | Performed by: NURSE PRACTITIONER

## 2019-12-31 PROCEDURE — 40000275 ZZH STATISTIC RCP TIME EA 10 MIN

## 2019-12-31 PROCEDURE — 83540 ASSAY OF IRON: CPT | Performed by: NURSE PRACTITIONER

## 2019-12-31 PROCEDURE — 94640 AIRWAY INHALATION TREATMENT: CPT

## 2019-12-31 PROCEDURE — 99225 ZZC SUBSEQUENT OBSERVATION CARE,LEVEL II: CPT | Mod: Z6 | Performed by: EMERGENCY MEDICINE

## 2019-12-31 PROCEDURE — 99207 ZZC APP CREDIT; MD BILLING SHARED VISIT: CPT | Performed by: PHYSICIAN ASSISTANT

## 2019-12-31 PROCEDURE — 83550 IRON BINDING TEST: CPT | Performed by: NURSE PRACTITIONER

## 2019-12-31 PROCEDURE — 25800030 ZZH RX IP 258 OP 636: Performed by: PHYSICIAN ASSISTANT

## 2019-12-31 RX ORDER — GUAIFENESIN 600 MG/1
600 TABLET, EXTENDED RELEASE ORAL 2 TIMES DAILY
Status: DISCONTINUED | OUTPATIENT
Start: 2019-12-31 | End: 2020-01-01 | Stop reason: HOSPADM

## 2019-12-31 RX ORDER — IPRATROPIUM BROMIDE AND ALBUTEROL SULFATE 2.5; .5 MG/3ML; MG/3ML
3 SOLUTION RESPIRATORY (INHALATION) EVERY 4 HOURS PRN
Status: DISCONTINUED | OUTPATIENT
Start: 2019-12-31 | End: 2020-01-01 | Stop reason: HOSPADM

## 2019-12-31 RX ORDER — AZITHROMYCIN 250 MG/1
250 TABLET, FILM COATED ORAL DAILY
Status: DISCONTINUED | OUTPATIENT
Start: 2020-01-01 | End: 2019-12-31

## 2019-12-31 RX ORDER — AZITHROMYCIN 250 MG/1
250 TABLET, FILM COATED ORAL DAILY
Status: DISCONTINUED | OUTPATIENT
Start: 2019-12-31 | End: 2020-01-01 | Stop reason: HOSPADM

## 2019-12-31 RX ORDER — AZITHROMYCIN 250 MG/1
500 TABLET, FILM COATED ORAL ONCE
Status: DISCONTINUED | OUTPATIENT
Start: 2019-12-31 | End: 2019-12-31

## 2019-12-31 RX ADMIN — GUAIFENESIN 600 MG: 600 TABLET ORAL at 20:17

## 2019-12-31 RX ADMIN — SODIUM CHLORIDE: 9 INJECTION, SOLUTION INTRAVENOUS at 07:30

## 2019-12-31 RX ADMIN — AZITHROMYCIN MONOHYDRATE 250 MG: 250 TABLET ORAL at 13:41

## 2019-12-31 RX ADMIN — IPRATROPIUM BROMIDE AND ALBUTEROL SULFATE 3 ML: .5; 3 SOLUTION RESPIRATORY (INHALATION) at 07:53

## 2019-12-31 RX ADMIN — PREDNISONE 40 MG: 20 TABLET ORAL at 08:05

## 2019-12-31 RX ADMIN — ACETAMINOPHEN 650 MG: 325 TABLET, FILM COATED ORAL at 08:05

## 2019-12-31 ASSESSMENT — ACTIVITIES OF DAILY LIVING (ADL)
ADLS_ACUITY_SCORE: 10
ADLS_ACUITY_SCORE: 10

## 2019-12-31 ASSESSMENT — PAIN DESCRIPTION - DESCRIPTORS: DESCRIPTORS: CONSTANT

## 2019-12-31 NOTE — PROGRESS NOTES
Sepsis Evaluation Progress Note    I was called to see Alcira Moe due to a change in vital signs. She is known to have an infection.     Physical Exam   Vital Signs:  Temp: 98  F (36.7  C) Temp src: Oral BP: (!) 123/90 Pulse: 104   Resp: 26 SpO2: 93 % O2 Device: None (Room air)(Pt resting in chair, not ambulating) Oxygen Delivery: 3 LPM    Lab:  Lactic Acid   Date Value Ref Range Status   12/30/2019 3.1 (H) 0.7 - 2.0 mmol/L Final     Lactate for Sepsis Protocol   Date Value Ref Range Status   12/29/2019 1.1 0.7 - 2.0 mmol/L Final       The patient is at baseline mental status. Reports she feels the best she has all day, sitting in chair, on room air.     The rest of their physical exam is significant for tachycardia, shortness of breath, although able to complete sentences, and cough.     Assessment & Plan   NO EVIDENCE OF SEPSIS at this time.  Vital sign, physical exam, and lab findings are likely due to DuoNebs.    Disposition: The patient will remain on the current unit. We will continue to monitor this patient closely. Discussed with attending physician, Dr Rachel Rodriguez.  Dayami Lobato, CNP    Sepsis Criteria   Sepsis: 2+ SIRS criteria due to infection  Severe Sepsis: Sepsis AND 1+ new sign of acute organ dysfunction (Note: lactate >2 is organ dysfunction)  Septic Shock: Sepsis AND hypotension despite volume resuscitation with 30 ml/kg crystalloid

## 2019-12-31 NOTE — PLAN OF CARE
"Observation Goals:     - Diagnostic tests and consults completed and resulted: Not met- pulmonary consult needing to be completed.   - Vital signs normal or at patient baseline: Not met, desat on RA, tachycardic.    - Infection is improving: Pending, monitoring.    - Dyspnea improved and O2 sats greater than 88% on room air or prior home oxygen levels: Not met,  pt on currently on 3L. SOB and desats when ambulating.     - Returns to baseline functional status: Not met, oxygen not at baseline.    - Safe disposition plan has been identified: Pending, no plan identified.       /87 (BP Location: Right arm)   Pulse 102   Temp 98  F (36.7  C) (Oral)   Resp (!) 34   Ht 1.549 m (5' 1\")   Wt 140.6 kg (310 lb)   SpO2 96%   BMI 58.57 kg/m      Patient alert and oriented x 4. Denies any pain. SOB upon ambulation. Per pt feels she is improving. Still de-sats when she ambulates. Encouraging patient to leave on oxygen when ambulating to bathroom, pt does not feel she needs to keep oxygen on. Nebs helping. Lung sounds still with wheezes, and increase resp rate. No chest pain. Frequent productive cough. Pt anxious and tearful and times, able to redirect. Will continue to monitor.       "

## 2019-12-31 NOTE — PROGRESS NOTES
[facility], [facility] Progress Note - Hospitalist Service, Gold 2       Date of Admission:  12/29/2019  Assessment & Plan   Alcira Moe is a 24 year old female admitted on 12/29/2019. She has a history significant for obesity who was originally admitted to ED after presenting with respiratory complaints and found to have RSV, now with persistent hypoxia transferring to inpatient medicine.     Acute hypoxic respiratory failure   RVS infection   Ground glass opacities on CT chest   Presented with SOB, dry cough and wheezing X 2 days with one episode of fever with associated myalgia. CXR with mild pulmonary venous congestion, likely right lower lobe atelectasis. D dimer was elevated therefore CT chest was completed and showed diffuse groundglass and nodular opacities scattered throughout the lungs greatest in the right upper lobe. Per discussion with radiology, ground glass finding on CT most likely viral. RVP positive for RSV. She was admitted to observation due to hypoxia, tachypnea with ambulation and started on IV steroids and Dubnebs QID. She was started on Azithromycin x 2 days, ceftriaxone x 1 initiation which have been discontinued. She continues to hypoxia while on room air down to 88% while lying in bed and 85% with ambulation. Unable to complete full sentences due to SOB. WBC normal on admission increased to 17.7 today after starting steroids, lactic acid 3.1 last evening improved to 1.5 today, afebrile. Blood cultures NGTD after 2 days. Exam with tachypnea, crackles at right middle lobe with mild wheezing. Denies history of asthma or pneumonia. Suspect hypoxia multifactorial due to RVS and obesity hypoventilation syndrome.    - Pulmonary consulted, appreciate recommendation    - Add Zpack for atypical coverage per pulmonology recs   - Follow sputum culture   - Wean O2 as able    - Continue scheduled Duonebs QID   - Continue prednisone 40 mg daily (for total of 5  days)   - Continue albuterol neb q1hr PRN     - Stop IVF   - Encourage flutter valve   - Add guaifenesin 600 mg BID        Hypochromic, normocytic anemia   Hgb 10.8 on presentation, stable. BL unknown. No signs of active bleeding.   - Add on iron panel   - Will need follow up with PCP upon discharge for repeat CBC        Diet: Regular Diet Adult    DVT Prophylaxis: Pneumatic Compression Devices and Ambulate every shift  Mata Catheter: not present  Code Status: Full Code      Disposition Plan   Expected discharge: 1-2 days, recommended to prior living arrangement once O2 sats >88% with ambulation.  Entered: ZAK Ward 12/31/2019, 10:32 AM       The patient's care was discussed with the Attending Physician, Dr. Welch, Bedside Nurse and Patient.    ZAK Ward  Hospitalist Service, [address]  Pager: [phone]  Please see sticky note for cross cover information  ______________________________________________________________________    Interval History   Patient reports feeling better today. Able to speak in full sentences without increased SOB. Notes oxygen drops while laying down however does not feel any increased SOB. Notes intermittent sweats and chills, however reports baseline for patient due to body habitus. Hoping to discharge home later today vs tomorrow.     Nurse notes O2 saturation dropped to 85% while ambulating with increased tachypnea.      Data reviewed today: I reviewed all medications, new labs and imaging results over the last 24 hours.     Physical Exam   Vital Signs: Temp: 97.5  F (36.4  C) Temp src: Oral BP: 108/78 Pulse: 99 Heart Rate: 120 Resp: 30 SpO2: 90 % O2 Device: None (Room air) Oxygen Delivery: 3 LPM  Weight: 310 lbs 0 oz  GENERAL: Alert and oriented x 3. NAD.   HEENT: Anicteric sclera. PERRL. Mucous membranes moist and without lesions.   CV: RRR. S1, S2. No murmurs appreciated.   RESPIRATORY: Tachypnea during  conversation, crackles at right lung mid lobe with mild wheezing. Distant lung sounds.   GI: Abdomen soft, obese,  and non distended, bowel sounds present. No tenderness, rebound, guarding.   MUSCULOSKELETAL: No joint swelling or tenderness. Moves all extremities.   NEUROLOGICAL: No focal deficits appreciated.   EXTREMITIES: 1+ bilateral peripheral edema. Intact bilateral pedal pulses.   SKIN: No jaundice. No rashes.       Data   Recent Labs   Lab 12/31/19  0538 12/30/19  0534 12/29/19  1917 12/29/19  1815   WBC 17.7* 9.8 10.1 Canceled, Test credited   HGB 10.3* 11.2* 10.8* Canceled, Test credited   MCV 85 86 85 Canceled, Test credited    233 226 Canceled, Test credited    138  --  137   POTASSIUM 3.9 4.2  --  4.2   CHLORIDE 111* 108  --  107   CO2 25 21  --  27   BUN 12 10  --  10   CR 0.68 0.60  --  0.67   ANIONGAP 5 9  --  3   JANNET 8.2* 8.6  --  8.8   * 142*  --  75     Medications     sodium chloride 125 mL/hr at 12/31/19 0730       ipratropium - albuterol 0.5 mg/2.5 mg/3 mL  3 mL Nebulization 4x daily     predniSONE  40 mg Oral Daily

## 2019-12-31 NOTE — PLAN OF CARE
Observation Goals:     - Diagnostic tests and consults completed and resulted: Not met- pulmonary consult needing to be completed.   - Vital signs normal or at patient baseline: Not met, desat on RA, tachycardic.     - Infection is improving: Pending, monitoring.     - Dyspnea improved and O2 sats greater than 88% on room air or prior home oxygen levels: Not met,  pt on currently on 3L. SOB and desats when ambulating.      - Returns to baseline functional status: Not met, oxygen not at baseline.     - Safe disposition plan has been identified: Pending, no plan identified.

## 2019-12-31 NOTE — PROGRESS NOTES
Observation Unit Transfer Summary     Patient ID:  Alcira Moe  MRN: [id number]  24 year old  YOB: 1995    Observation Admit Date: 12/29/2019    ED Admitting Attending: Mp Rosado MD    Transfer Date and Time: December 31, 2019 at 10:34 AM     Transferring Observation Provider: TIFFANY Saenz CNP    Admission Diagnoses:     1. RSV bronchiolitis        Transfer Diagnoses:    1. Hypoxia  2. RSV    Emergency Department and Observation Course:     1. RSV: present with SOB, dry cough, and wheezing x 2 day. Report coughing fits where she almost vomits. She did have an episode of fever 12/28 before going to bed. Reports associated myalgia. Symptoms seem to be getting worse. Denies hx of asthma or pneumonia.  D-dimer found to be elevated to 1.2. CT chest obtained, show diffuse groundglass and nodular opacities scattered throughout the lungs, greatest in the right upper lobe . Discussed with radiology who reported findings are most likely viral.  Influenza PCR positive for resp syncytial virus. CXR, findings suggest mild pulmonary venous congestion. Likely right lower lobe atelectasis. She was noted to drop her sats into the high 90s with walking, so was admitted for supportive cares as well as steroids and neb treatments. This morning she states that she's feeling better, though was noted to have sats of 88% while lying in bed this am. Throughout the day, patient sating less than 88% on RA while in bed. Unable to complete a full sentence without shortness of breath. DIscussed with medicine who recommended pulmonology consult. Discussed with pulmonology who recommended: Duonebs QID, Albuterol neb q 1 hour prn, continue Prednisone for a total of 5 days, agreed with stopping antibiotics, sputum culture and wean O2.   Recommended transfer to medicine if no improvement in am.  Today the patient remains dyspneic with RR 24/min. And hypoxia with ambulation and O2 sats 85% on room air.  Patient is  "not able to complete a full sentence due to shortness of breath.  Discussed with UR and patient meets inpatient criteria.  Discussed with internal medicine triage who accepted the patient to the medicine service.      At this time the patient has failed observation management due to ongoing hypoxia and will be transferred to inpatient status.    Consults: pulmonary    DATA:    Transfer Exam:    /78   Pulse 99   Temp 97.5  F (36.4  C)   Resp 30   Ht 1.549 m (5' 1\")   Wt 140.6 kg (310 lb)   SpO2 90%   BMI 58.57 kg/m    Exam:  Constitutional: healthy, alert and no distress  Cardiovascular: No lifts, heaves, or thrills. RRR. No murmurs, clicks gallops or rub  Respiratory: Good diaphragmatic excursion. Lungs with expiratory wheezing.  Gastrointestinal: Abdomen soft, non-tender. BS normal. No masses, organomegaly  Musculoskeletal: extremities normal- no gross deformities noted, gait normal and normal muscle tone  Skin: no suspicious lesions or rashes  Neurologic: Gait normal. Alert and oriented.   Psychiatric: mentation appears normal and affect normal/bright    /78   Pulse 99   Temp 97.5  F (36.4  C)   Resp 30   Ht 1.549 m (5' 1\")   Wt 140.6 kg (310 lb)   SpO2 90%   BMI 58.57 kg/m          Current Medications:    No current outpatient medications on file.       Medications Prior to Admission:    No medications prior to admission.       Significant Diagnostic Studies:     Results for orders placed or performed during the hospital encounter of 12/29/19   CBC with platelets differential     Status: None   Result Value Ref Range    WBC Canceled, Test credited 4.0 - 11.0 10e9/L    RBC Count Canceled, Test credited 3.8 - 5.2 10e12/L    Hemoglobin Canceled, Test credited 11.7 - 15.7 g/dL    Hematocrit Canceled, Test credited 35.0 - 47.0 %    MCV Canceled, Test credited 78 - 100 fl    MCH Canceled, Test credited 26.5 - 33.0 pg    MCHC Canceled, Test credited 31.5 - 36.5 g/dL    RDW Canceled, Test " credited 10.0 - 15.0 %    Platelet Count Canceled, Test credited 150 - 450 10e9/L    Diff Method Canceled, Test credited    Basic metabolic panel     Status: None   Result Value Ref Range    Sodium 137 133 - 144 mmol/L    Potassium 4.2 3.4 - 5.3 mmol/L    Chloride 107 94 - 109 mmol/L    Carbon Dioxide 27 20 - 32 mmol/L    Anion Gap 3 3 - 14 mmol/L    Glucose 75 70 - 99 mg/dL    Urea Nitrogen 10 7 - 30 mg/dL    Creatinine 0.67 0.52 - 1.04 mg/dL    GFR Estimate >90 >60 mL/min/[1.73_m2]    GFR Estimate If Black >90 >60 mL/min/[1.73_m2]    Calcium 8.8 8.5 - 10.1 mg/dL   CRP inflammation     Status: Abnormal   Result Value Ref Range    CRP Inflammation 85.0 (H) 0.0 - 8.0 mg/L   CBC with platelets differential     Status: Abnormal   Result Value Ref Range    WBC 10.1 4.0 - 11.0 10e9/L    RBC Count 4.11 3.8 - 5.2 10e12/L    Hemoglobin 10.8 (L) 11.7 - 15.7 g/dL    Hematocrit 35.1 35.0 - 47.0 %    MCV 85 78 - 100 fl    MCH 26.3 (L) 26.5 - 33.0 pg    MCHC 30.8 (L) 31.5 - 36.5 g/dL    RDW 14.5 10.0 - 15.0 %    Platelet Count 226 150 - 450 10e9/L    Diff Method Automated Method     % Neutrophils 59.8 %    % Lymphocytes 27.3 %    % Monocytes 8.0 %    % Eosinophils 4.0 %    % Basophils 0.6 %    % Immature Granulocytes 0.3 %    Nucleated RBCs 0 0 /100    Absolute Neutrophil 6.0 1.6 - 8.3 10e9/L    Absolute Lymphocytes 2.8 0.8 - 5.3 10e9/L    Absolute Monocytes 0.8 0.0 - 1.3 10e9/L    Absolute Eosinophils 0.4 0.0 - 0.7 10e9/L    Absolute Basophils 0.1 0.0 - 0.2 10e9/L    Abs Immature Granulocytes 0.0 0 - 0.4 10e9/L    Absolute Nucleated RBC 0.0    D dimer quantitative     Status: Abnormal   Result Value Ref Range    D Dimer 1.2 (H) 0.0 - 0.50 ug/ml FEU   HCG qualitative     Status: None   Result Value Ref Range    HCG Qualitative Serum Negative NEG^Negative   Lactic acid level STAT     Status: None   Result Value Ref Range    Lactate for Sepsis Protocol 1.1 0.7 - 2.0 mmol/L   Basic metabolic panel     Status: Abnormal   Result  Value Ref Range    Sodium 138 133 - 144 mmol/L    Potassium 4.2 3.4 - 5.3 mmol/L    Chloride 108 94 - 109 mmol/L    Carbon Dioxide 21 20 - 32 mmol/L    Anion Gap 9 3 - 14 mmol/L    Glucose 142 (H) 70 - 99 mg/dL    Urea Nitrogen 10 7 - 30 mg/dL    Creatinine 0.60 0.52 - 1.04 mg/dL    GFR Estimate >90 >60 mL/min/[1.73_m2]    GFR Estimate If Black >90 >60 mL/min/[1.73_m2]    Calcium 8.6 8.5 - 10.1 mg/dL   CBC with platelets     Status: Abnormal   Result Value Ref Range    WBC 9.8 4.0 - 11.0 10e9/L    RBC Count 4.26 3.8 - 5.2 10e12/L    Hemoglobin 11.2 (L) 11.7 - 15.7 g/dL    Hematocrit 36.5 35.0 - 47.0 %    MCV 86 78 - 100 fl    MCH 26.3 (L) 26.5 - 33.0 pg    MCHC 30.7 (L) 31.5 - 36.5 g/dL    RDW 14.6 10.0 - 15.0 %    Platelet Count 233 150 - 450 10e9/L   Lactic acid whole blood     Status: Abnormal   Result Value Ref Range    Lactic Acid 3.1 (H) 0.7 - 2.0 mmol/L   Basic metabolic panel     Status: Abnormal   Result Value Ref Range    Sodium 141 133 - 144 mmol/L    Potassium 3.9 3.4 - 5.3 mmol/L    Chloride 111 (H) 94 - 109 mmol/L    Carbon Dioxide 25 20 - 32 mmol/L    Anion Gap 5 3 - 14 mmol/L    Glucose 119 (H) 70 - 99 mg/dL    Urea Nitrogen 12 7 - 30 mg/dL    Creatinine 0.68 0.52 - 1.04 mg/dL    GFR Estimate >90 >60 mL/min/[1.73_m2]    GFR Estimate If Black >90 >60 mL/min/[1.73_m2]    Calcium 8.2 (L) 8.5 - 10.1 mg/dL   CBC with platelets     Status: Abnormal   Result Value Ref Range    WBC 17.7 (H) 4.0 - 11.0 10e9/L    RBC Count 3.91 3.8 - 5.2 10e12/L    Hemoglobin 10.3 (L) 11.7 - 15.7 g/dL    Hematocrit 33.4 (L) 35.0 - 47.0 %    MCV 85 78 - 100 fl    MCH 26.3 (L) 26.5 - 33.0 pg    MCHC 30.8 (L) 31.5 - 36.5 g/dL    RDW 14.7 10.0 - 15.0 %    Platelet Count 232 150 - 450 10e9/L   Lactic acid whole blood     Status: None   Result Value Ref Range    Lactic Acid 1.5 0.7 - 2.0 mmol/L   Influenza A and B and RSV PCR     Status: Abnormal   Result Value Ref Range    Specimen Description Nasopharyngeal     Influenza A PCR  Negative NEG^Negative    Influenza B PCR Negative NEG^Negative    Resp Syncytial Virus Positive (A) NEG^Negative   Blood culture     Status: None (Preliminary result)   Result Value Ref Range    Specimen Description Blood Left Arm     Special Requests Received in aerobic bottle only     Culture Micro No growth after 2 days    Gram stain     Status: None   Result Value Ref Range    Specimen Description Sputum     Special Requests Screen     Gram Stain <10 Squamous epithelial cells/low power field     Gram Stain <25 PMNs/low power field     Gram Stain Moderate  Mixed gram negative and positive amy          Signed:  TIFFANY Saenz CNP  December 31, 2019 at 10:34 AM

## 2019-12-31 NOTE — PLAN OF CARE
Outpatient/Observation goals to be met before discharge home:    -diagnostic tests and consults completed and resulted : no  -vital signs normal or at patient baseline : no  -infection is improving : no  -dyspnea improved and O2 sats greater than 88% on room air or prior home oxygen levels : no  -returns to baseline functional status : no  -safe disposition plan has been identified: yes

## 2019-12-31 NOTE — CONSULTS
"[facility]  Pulmonary Consult     Patient:  Alcira Moe, Date of birth 1995, Medical record number [id number]  Date of Visit:  12/31/2019    Reason for Consult: \"RSV positive. desating while awake in bed to the 80's. Requiring 2-3 liters of O2. Unable to complete sentence due to SOB.\"         Assessment and Recommendations:     Acute Hypoxic Respiratory Insufficiency  RSV Pneumonitis  Possible Atypical Bacterial Pneumonia  Hx of Cigarette Smoking  Faint Expiratory Wheezing on Examination    The most likely etiology of the patient's respiratory sx, hypoxia, and imaging findings is RSV upper respiratory infection. She reports improvement of her dyspnea with oxygen, steroids, and duoneb therapy (although she thinks the duonebs have been more helpful than the steroids). She has no prior hx of underlying asthma or COPD, however, she is a cigarette smoker, has faint expiratory wheezing on examination, and reports symptomatic relief with duoneb therapy which could indicate underlying reactive airway disease. Atypical pneumonia in addition to viral URI could also be contributing to her clinical picture so an empiric 5 day course of azithromycin would be reasonable. No evidence of focal/lobar pneumonia on imaging.     - 5 day course of azithromycin  - Okay to complete steroid burst and treat with duonebs given her subjective/objective improvement with these therapies and presence of wheezing on examination; consider PFTs in outpatient setting when recovered from current respiratory illness  - Patient is tolerating PO intake and lactate has normalized; okay to discontinue MIVF so to avoid pulmonary edema    Patient staffed with Dr. Martinez.    Jim Edmonds MD  Internal Medicine PGY-3        History of Present Illness     Alcira Moe is a 24 yof w/ no prior past medical hx admitted 12/29 w/ SOB and fever. She noted onset of fever 1 day PTA associated with productive cough and " "\"wheezing.\" She denied rhinorrhea, sore throat, nausea, vomiting, abdominal pain, leg pain, or extremity swelling. In ED patient had Spo2 80% on RA while walking, CRP was 85, D-dimer was elevated, CTPE notable for diffuse GGO/nodular opacities that was upper lobe predominant, and there was no PE. RSV +.     Today the patient reports mild dyspnea which she associates with needing to pause her speech in order to take a deep breath in-between sentences. She does report cough productive of brown sputum which is abnormal. At home she endorsed tmax of 101 degrees F, however, reports that she has been afebrile since admission. She does smoke 5 cigarettes/day at baseline, but denies an underlying hx of asthma, COPD, or inhaler use. She denies sick contacts or recent travel. She denies CP, nausea, vomiting, diarrhea, or dysuria.     Review of Systems:  10 point ROS completed and negative except for above.    History reviewed. No pertinent past medical history.    History reviewed. No pertinent surgical history.    No family history on file.    Social History  Lives in [place] with boyfriend.     Social History     Tobacco Use     Smoking status: Smokes 5 cigarettes per day   Substance Use Topics     Alcohol use: None     Drug use: None     Patient Active Problem List   Diagnosis     RSV (acute bronchiolitis due to respiratory syncytial virus)          Current Medications & Allergies:       ipratropium - albuterol 0.5 mg/2.5 mg/3 mL  3 mL Nebulization 4x daily     predniSONE  40 mg Oral Daily     Infusions/Drips:    sodium chloride 125 mL/hr at 12/31/19 0730     No Known Allergies         Physical Exam:   Ranges for vital signs:  Temp:  [97.5  F (36.4  C)-98  F (36.7  C)] 97.5  F (36.4  C)  Pulse:  [] 99  Heart Rate:  [120] 120  Resp:  [26-42] 30  BP: (108-136)/(72-94) 108/78  SpO2:  [90 %-97 %] 90 %  Vitals:    12/29/19 1614   Weight: 140.6 kg (310 lb)     Physical Examination:  GENERAL:  Lying in bed, no acute " distress  HEAD:  Head is normocephalic, atraumatic   EYES:  Eyes have anicteric sclerae   LUNGS:  Mildly increased respiratory effort, B/L scattered inspiratory crackles, faint expiratory wheezing on forced expiration  CARDIOVASCULAR:  Regular rate and rhythm   ABDOMEN:  Soft, nontender, nondistended  EXT:  No edema.    NEUROLOGIC:  Awake and alert, answering questions appropriately.  PSYCH: Normal affect.         Laboratory Data:     Immune Globulin Studies   No lab results found.    Metabolic Studies       Recent Labs   Lab Test 12/31/19  0539 12/31/19  0538 12/30/19  2210 12/30/19  0534   NA  --  141  --  138   POTASSIUM  --  3.9  --  4.2   CHLORIDE  --  111*  --  108   CO2  --  25  --  21   ANIONGAP  --  5  --  9   BUN  --  12  --  10   CR  --  0.68  --  0.60   GFRESTIMATED  --  >90  --  >90   GLC  --  119*  --  142*   JANNET  --  8.2*  --  8.6   LACT 1.5  --  3.1*  --        Hepatic Studies  No lab results found.    Invalid input(s): LD    Hematology Studies      Recent Labs   Lab Test 12/31/19  0538 12/30/19  0534 12/29/19  1917 12/29/19  1815   WBC 17.7* 9.8 10.1 Canceled, Test credited   ANEU  --   --  6.0  --    ALYM  --   --  2.8  --    DULCE MARIA  --   --  0.8  --    AEOS  --   --  0.4  --    HGB 10.3* 11.2* 10.8* Canceled, Test credited   HCT 33.4* 36.5 35.1 Canceled, Test credited    233 226 Canceled, Test credited       Clotting Studies  No lab results found.    Arterial Blood Gas Testing  No lab results found.     Urine Studies   No lab results found.    Microbiology:  Last 6 Culture results with specimen source  Culture Micro   Date Value Ref Range Status   12/29/2019 No growth after 2 days  Preliminary    Specimen Description   Date Value Ref Range Status   12/30/2019 Sputum  Final   12/29/2019 Blood Left Arm  Final   12/29/2019 Nasopharyngeal  Final        Infectious Disease Testing     No lab results found.  No lab results found.    Imaging:  Recent Results (from the past 48 hour(s))   XR Chest 2  Views    Narrative    Exam: XR CHEST 2 VW, 12/29/2019 5:01 PM    Indication: dysonea, cough, fever    Comparison: None    Findings:   Cardiac silhouette is borderline enlarged. Cephalization. No  interstitial changes. Subtle opacity at the right lung base with  elevated right hemidiaphragm. Subtle tiny right pleural effusion.      Impression    Impression: Findings suggest mild pulmonary venous congestion. Likely  right lower lobe atelectasis. Can't exclude early pneumonia.    MANNY CLIFTON MD   CT Chest Pulmonary Embolism w Contrast    Narrative    CT CHEST PULMONARY EMBOLISM W CONTRAST, 12/29/2019 9:34 PM    History: dyspnea, elevated d-dimer    Comparison: Same-day chest radiograph    Technique: Helical acquisition of CT images of the chest from the lung  apices to the kidneys were acquired after the administration of  intravenous contrast according to the CT pulmonary angiogram protocol.  Axial images were reconstructed in 1 and 3 mm slice thickness. Coronal  reconstructions performed. Three-dimensional (3D) post-processed  angiographic images were reconstructed, archived to PACS and used in  the interpretation of this study    Findings:     The contrast bolus is adequate.  There is no pulmonary embolism.    Lung Parenchyma:  The central tracheobronchial tree is patent. No pneumothorax or  pleural effusion. Diffuse patchy groundglass and nodular opacities in  a peribronchovascular distribution, greatest in the right upper lobe.  No focal airspace consolidation.    Mediastinum:  Visualized portions of the thyroid gland are unremarkable in  appearance. The heart is normal in size, without pericardial effusion.  The thoracic vessels are normal in caliber. Common origin of the right  brachiocephalic and left common carotid arteries, normal anatomic  variant. Enlarged mediastinal and hilar lymph nodes, for example a  left prevascular lymph node measuring 1.1 cm in short axis dimension  (series 4, image 27).  Bilateral hilar adenopathy, right greater than  left, with enlarged right hilar lymph node measuring 1.6 cm in short  axis dimension (series 9, image 111). No axillary lymphadenopathy. The  esophagus is unremarkable in appearance.    Upper abdomen:  Limited evaluation of the upper abdomen by contrast, bolus timing and  coverage. Upper abdomen is unremarkable.    Bones and soft tissues:  No acute or suspicious osseous findings.      Impression    Impression:  1. No pulmonary embolism identified.  2. Diffuse groundglass and nodular opacities scattered throughout the  lungs in a peribronchovascular distribution, greatest in the right  upper lobe, with prominent mediastinal and hilar adenopathy.  Differential considerations include infection, including atypical  sources of infection, and pulmonary sarcoidosis.    I have personally reviewed the examination and initial interpretation  and I agree with the findings.    MD Jim ALBA MD

## 2019-12-31 NOTE — PROGRESS NOTES
Neuro: A&Ox4.   Cardiac: Afebrile, VSS.            Respiratory: RA at rest. 1-2 liters with exertion or when sleeping.  GI/: Voiding spontaneously.   Diet/appetite: Regular. Tolerating well. Denies n/v.  Activity: Up ad dolores, independent.   Pain: . Denies  Skin: Intact  Lines: PIV SL.     Patient transferred from observation to inpatient. Walk testes in the hickey on room air. 02 sat 85-92% 's.

## 2019-12-31 NOTE — PROGRESS NOTES
O2 sats 85% on room air while walking and improved to 93% on room air at rest.  RR 24, patient unable to complete full sentences due to SOB.  Will discuss with medicine triage and UR.    TIFFANY Estrella, CNP

## 2020-01-01 VITALS
RESPIRATION RATE: 16 BRPM | WEIGHT: 293 LBS | HEIGHT: 61 IN | TEMPERATURE: 98.2 F | SYSTOLIC BLOOD PRESSURE: 107 MMHG | BODY MASS INDEX: 55.32 KG/M2 | DIASTOLIC BLOOD PRESSURE: 67 MMHG | OXYGEN SATURATION: 94 % | HEART RATE: 98 BPM

## 2020-01-01 LAB
BACTERIA SPEC CULT: NORMAL
LACTATE BLD-SCNC: 1.3 MMOL/L (ref 0.7–2)
SPECIMEN SOURCE: NORMAL

## 2020-01-01 PROCEDURE — 83605 ASSAY OF LACTIC ACID: CPT | Performed by: PEDIATRICS

## 2020-01-01 PROCEDURE — 25000132 ZZH RX MED GY IP 250 OP 250 PS 637: Performed by: PHYSICIAN ASSISTANT

## 2020-01-01 PROCEDURE — 25000132 ZZH RX MED GY IP 250 OP 250 PS 637: Performed by: PEDIATRICS

## 2020-01-01 PROCEDURE — 99207 ZZC CDG-MDM COMPONENT: MEETS MODERATE - UP CODED: CPT | Performed by: PEDIATRICS

## 2020-01-01 PROCEDURE — 99239 HOSP IP/OBS DSCHRG MGMT >30: CPT | Performed by: PEDIATRICS

## 2020-01-01 PROCEDURE — 36415 COLL VENOUS BLD VENIPUNCTURE: CPT | Performed by: PEDIATRICS

## 2020-01-01 PROCEDURE — 25000131 ZZH RX MED GY IP 250 OP 636 PS 637: Performed by: PHYSICIAN ASSISTANT

## 2020-01-01 RX ORDER — AZITHROMYCIN 250 MG/1
250 TABLET, FILM COATED ORAL DAILY
Qty: 1 TABLET | Refills: 0 | Status: SHIPPED | OUTPATIENT
Start: 2020-01-02 | End: 2020-01-03

## 2020-01-01 RX ORDER — PREDNISONE 20 MG/1
40 TABLET ORAL DAILY
Qty: 2 TABLET | Refills: 0 | Status: SHIPPED | OUTPATIENT
Start: 2020-01-02 | End: 2020-01-03

## 2020-01-01 RX ORDER — ALBUTEROL SULFATE 90 UG/1
2 AEROSOL, METERED RESPIRATORY (INHALATION) EVERY 4 HOURS PRN
Qty: 6.7 G | Refills: 0 | Status: SHIPPED | OUTPATIENT
Start: 2020-01-01

## 2020-01-01 RX ADMIN — GUAIFENESIN 600 MG: 600 TABLET ORAL at 07:56

## 2020-01-01 RX ADMIN — PREDNISONE 40 MG: 20 TABLET ORAL at 07:56

## 2020-01-01 RX ADMIN — ACETAMINOPHEN 650 MG: 325 TABLET, FILM COATED ORAL at 04:17

## 2020-01-01 RX ADMIN — AZITHROMYCIN MONOHYDRATE 250 MG: 250 TABLET ORAL at 07:55

## 2020-01-01 ASSESSMENT — ACTIVITIES OF DAILY LIVING (ADL)
ADLS_ACUITY_SCORE: 10

## 2020-01-01 NOTE — PROGRESS NOTES
"Pt is A&O, denies pain. States she feels \"much better\" today and that her breathing is easier. O2 sats while awake are in the 90's on RA. Walk test performed. Tolerating diet and voiding. Plan for discharge today with follow up with primary.   "

## 2020-01-01 NOTE — DISCHARGE SUMMARY
[facility], [place]  Hospitalist Discharge Summary       Date of Admission:  12/29/2019  Date of Discharge:  1/1/2020 12:20 PM  Discharging Provider: Nathan Welch MD  Discharge Team: Hospitalist Service, [place]     Discharge Diagnoses   Acute hypoxic respiratory failure   RSV infection  Normocytic anemia    Follow-ups Needed After Discharge   Follow-up Appointments     Adult [facility] Follow-up and recommended labs and tests      Follow up with primary care provider, Physician No Ref-Primary, within 7   days for hospital follow- up.  The following labs/tests are recommended:   oxygen saturation.  Call [phone] to establish a visit and possible   primary care provider    Follow up with sleep medicine clinic in next 1-2 months for possible sleep   evaluation. [phone]    Appointments on [place] and/or [facility] (with [facility] or [facility]   provider or service). Call [phone] if you haven't heard regarding   these appointments within 7 days of discharge.           Unresulted Labs Ordered in the Past 30 Days of this Admission     Date and Time Order Name Status Description    12/30/2019 1805 Sputum Culture Aerobic Bacterial Preliminary     12/29/2019 1626 Blood culture Preliminary       These results will be followed up by PCP    Discharge Disposition   Discharged to home  Condition at discharge: Good    Hospital Course   Alcira Moe is a 24 year old female admitted on 12/29/2019. She has a history significant for obesity who was admitted with hypoxia secondary to RSV infection and likely with additional contributing factors      Acute hypoxic respiratory failure   RVS infection   Ground glass opacities on CT chest   Presented with SOB, dry cough and wheezing X 2 days with one episode of fever with associated myalgia. D dimer was elevated therefore CT chest was completed and showed diffuse groundglass and nodular opacities scattered throughout the lungs greatest in  the right upper lobe. Per discussion with radiology, ground glass finding on CT most likely viral. RVP positive for RSV. She was admitted to observation due to hypoxia, tachypnea with ambulation and started on IV steroids and Dubnebs QID. Pulmonary was consulted. She will complete a 5 day course of prednisone and azithromycin. Upon discharge her hypoxia was improved when sleeping and passed a 6 minute walk test. I would recommend follow up with sleep medicine to consider sleep study, as RAMON/OHS may be contributing to her respiratory status. She will have a primary care visit within 3-5 days and      Hypochromic, normocytic anemia likely secondary to iron deficiency anemia and inflammation as ferritin mildly elevated but oxygen saturation very low. Would re-evaluate as outpatient for iron repletion. Did not start given RSV infection and likely acute inflammation which would affect iron absorption.      Consultations This Hospital Stay   VASCULAR ACCESS CARE ADULT IP CONSULT  PULMONARY GENERAL ADULT IP CONSULT    Code Status   Full Code    Time Spent on this Encounter   I, Nathan Welch MD, personally saw the patient today and spent greater than 30 minutes discharging this patient.       Nathan Welch MD  [facility], [place]  ______________________________________________________________________    Physical Exam   Vital Signs: Temp: 98.2  F (36.8  C) Temp src: Oral BP: 107/67 Pulse: 98 Heart Rate: 98 Resp: 16 SpO2: 94 % O2 Device: None (Room air)    Weight: 310 lbs 0 oz    Gen: Patient sitting up in bed, NAD  Resp: Breathing comfortably, diminished breath sounds, prolonged expiratory phase, no crackles  CV: RRR, no m/r/g  Abd: soft, non-tender, non-distended  Ext: No pitting edema  Neuro: Alert and oriented       Primary Care Physician   Physician No Ref-Primary    Discharge Orders      Reason for your hospital stay    Viral infection with breathing difficulty     Adult [facility]  Follow-up and recommended labs and tests    Follow up with primary care provider, Physician No Ref-Primary, within 7 days for hospital follow- up.  The following labs/tests are recommended: oxygen saturation.  Call [phone] to establish a visit and possible primary care provider    Follow up with sleep medicine clinic in next 1-2 months for possible sleep evaluation. [phone]    Appointments on [place] and/or [facility] (with [facility] or [facility] provider or service). Call [phone] if you haven't heard regarding these appointments within 7 days of discharge.     Activity    Your activity upon discharge: activity as tolerated     Diet    Follow this diet upon discharge: Orders Placed This Encounter      Regular Diet Adult       Significant Results and Procedures   Results for orders placed or performed during the hospital encounter of 12/29/19   XR Chest 2 Views    Narrative    Exam: XR CHEST 2 VW, 12/29/2019 5:01 PM    Indication: dysonea, cough, fever    Comparison: None    Findings:   Cardiac silhouette is borderline enlarged. Cephalization. No  interstitial changes. Subtle opacity at the right lung base with  elevated right hemidiaphragm. Subtle tiny right pleural effusion.      Impression    Impression: Findings suggest mild pulmonary venous congestion. Likely  right lower lobe atelectasis. Can't exclude early pneumonia.    MANNY CLIFTON MD   CT Chest Pulmonary Embolism w Contrast    Narrative    CT CHEST PULMONARY EMBOLISM W CONTRAST, 12/29/2019 9:34 PM    History: dyspnea, elevated d-dimer    Comparison: Same-day chest radiograph    Technique: Helical acquisition of CT images of the chest from the lung  apices to the kidneys were acquired after the administration of  intravenous contrast according to the CT pulmonary angiogram protocol.  Axial images were reconstructed in 1 and 3 mm slice thickness. Coronal  reconstructions performed. Three-dimensional (3D) post-processed  angiographic images  were reconstructed, archived to PACS and used in  the interpretation of this study    Findings:     The contrast bolus is adequate.  There is no pulmonary embolism.    Lung Parenchyma:  The central tracheobronchial tree is patent. No pneumothorax or  pleural effusion. Diffuse patchy groundglass and nodular opacities in  a peribronchovascular distribution, greatest in the right upper lobe.  No focal airspace consolidation.    Mediastinum:  Visualized portions of the thyroid gland are unremarkable in  appearance. The heart is normal in size, without pericardial effusion.  The thoracic vessels are normal in caliber. Common origin of the right  brachiocephalic and left common carotid arteries, normal anatomic  variant. Enlarged mediastinal and hilar lymph nodes, for example a  left prevascular lymph node measuring 1.1 cm in short axis dimension  (series 4, image 27). Bilateral hilar adenopathy, right greater than  left, with enlarged right hilar lymph node measuring 1.6 cm in short  axis dimension (series 9, image 111). No axillary lymphadenopathy. The  esophagus is unremarkable in appearance.    Upper abdomen:  Limited evaluation of the upper abdomen by contrast, bolus timing and  coverage. Upper abdomen is unremarkable.    Bones and soft tissues:  No acute or suspicious osseous findings.      Impression    Impression:  1. No pulmonary embolism identified.  2. Diffuse groundglass and nodular opacities scattered throughout the  lungs in a peribronchovascular distribution, greatest in the right  upper lobe, with prominent mediastinal and hilar adenopathy.  Differential considerations include infection, including atypical  sources of infection, and pulmonary sarcoidosis.    I have personally reviewed the examination and initial interpretation  and I agree with the findings.    MANNY CLIFTON MD       Discharge Medications   Current Discharge Medication List      START taking these medications    Details   albuterol  (PROAIR HFA/PROVENTIL HFA/VENTOLIN HFA) 108 (90 Base) MCG/ACT inhaler Inhale 2 puffs into the lungs every 4 hours as needed for shortness of breath / dyspnea or wheezing  Qty: 6.7 g, Refills: 0    Comments: Pharmacy may dispense brand covered by insurance (Proair, or proventil or ventolin or generic albuterol inhaler)  Associated Diagnoses: Acute respiratory failure with hypoxia (H)      azithromycin (ZITHROMAX) 250 MG tablet Take 1 tablet (250 mg) by mouth daily for 1 day  Qty: 1 tablet, Refills: 0    Associated Diagnoses: Acute respiratory failure with hypoxia (H)      predniSONE (DELTASONE) 20 MG tablet Take 2 tablets (40 mg) by mouth daily for 1 day  Qty: 2 tablet, Refills: 0    Associated Diagnoses: Acute respiratory failure with hypoxia (H)           Allergies   No Known Allergies

## 2020-01-01 NOTE — PLAN OF CARE
Neuro: Pt is A/O x 4.    Cardiac:  Pt is tachycardia and has no complaints chest pain .         Respiratory: Pt has inspiratory and expiratory wheezes bilaterally in the lobes of the lungs. Pt does have dyspnea on exertion. Pt is 98% on RA. Pt does have oxygen at 1 LPM when sleeping because pt tends to drop in the low 80's when sleeping.   GI/: WDL  Diet/appetite: Regular diet. WDL  Activity:  Pt is independent in room.   Pain: Pt has no complaints of any pain during shift.   Skin: WDL  LDA's: Pt is able to do ADL's independently      Plan: Plan is to monitor pt for desaturation. Pt will likely discharge in the A.M.

## 2020-01-01 NOTE — PLAN OF CARE
"/80 (BP Location: Other (Comment))   Pulse 110   Temp 98.5  F (36.9  C) (Oral)   Resp 22   Ht 1.549 m (5' 1\")   Wt 140.6 kg (310 lb)   SpO2 96%   BMI 58.57 kg/m            Neuro: Pt is A/O x 4.   Cardiac:  Pt is tachycardia and has no complaints chest pain .         Respiratory: Pt has inspiratory and expiratory wheezes bilaterally in the lobes of the lungs. Pt does have dyspnea on exertion. Pt is 98% on RA. Pt does have oxygen at 1 LPM when sleeping because pt tends to drop in the low 80's when sleeping   GI/: WDL  Diet/appetite: Regular diet. WDL  Activity:  Pt is independent in room.   Pain: Pt has no complaints of any pain during shift.   Skin: WDL  LDA's: Pt is able to do ADL's independently      Plan: Plan is to monitor pt for desaturation. Pt will likely discharge in the A.M.     "

## 2020-01-01 NOTE — PROGRESS NOTES
DC instructions given to pt, verbalized understanding.  All belongings with pt, IV DC'd and documented. Ambulated to main lobby to discharge pharmacy and then for ride home with boyfriend.

## 2020-01-01 NOTE — PROGRESS NOTES
Walk test results: O2 sats on RA at rest 95%, pulse 83. O2 sats on RA while walking 91-93%, pulse 100-108.

## 2020-01-04 LAB
BACTERIA SPEC CULT: NO GROWTH
Lab: NORMAL
SPECIMEN SOURCE: NORMAL

## 2020-05-20 ENCOUNTER — NURSE TRIAGE (OUTPATIENT)
Dept: NURSING | Facility: CLINIC | Age: 25
End: 2020-05-20

## 2020-05-20 NOTE — TELEPHONE ENCOUNTER
Patient is calling with symptoms that she isn't sure if it is covid or something else. Her biggest complaint at this time is the headache and pressure in the head. No fever present. She has used Ibuprofen for pain and no other treatment at this time. Home care advice given for use of decongestants and other home care for sinus congestion.  Patient will call back if symptoms become worse or last for more than a week.     Additional Information    Negative: SEVERE difficulty breathing (e.g., struggling for each breath, speaks in single words)    Negative: Difficult to awaken or acting confused (e.g., disoriented, slurred speech)    Negative: Bluish (or gray) lips or face now    Negative: Shock suspected (e.g., cold/pale/clammy skin, too weak to stand, low BP, rapid pulse)    Negative: Sounds like a life-threatening emergency to the triager    Negative: Severe difficulty breathing (e.g., struggling for each breath, speaks in single words)    Negative: Sounds like a life-threatening emergency to the triager    Negative: [1] Sinus infection AND [2] taking an antibiotic AND [3] symptoms continue    Negative: [1] Difficulty breathing AND [2] not from stuffy nose (e.g., not relieved by cleaning out the nose)    Negative: [1] SEVERE headache AND [2] fever    Negative: [1] Redness or swelling on the cheek, forehead or around the eye AND [2] fever    Negative: Fever > 104 F (40 C)    Negative: Patient sounds very sick or weak to the triager    Negative: [1] SEVERE pain AND [2] not improved 2 hours after pain medicine    Negative: [1] Redness or swelling on the cheek, forehead or around the eye AND [2] no fever    Negative: [1] Fever > 101 F (38.3 C) AND [2] age > 60    Negative: [1] Fever > 100.0 F (37.8 C) AND [2] bedridden (e.g., nursing home patient, CVA, chronic illness, recovering from surgery)    Negative: [1] Fever > 100.0 F (37.8 C) AND [2] diabetes mellitus or weak immune system (e.g., HIV positive, cancer chemo,  "splenectomy, organ transplant, chronic steroids)    [1] Sinus congestion as part of a cold AND [2] present < 10 days    Answer Assessment - Initial Assessment Questions  1. COVID-19 DIAGNOSIS: \"Who made your Coronavirus (COVID-19) diagnosis?\" \"Was it confirmed by a positive lab test?\" If not diagnosed by a HCP, ask \"Are there lots of cases (community spread) where you live?\" (See public health department website, if unsure)    * MAJOR community spread: high number of cases; numbers of cases are increasing; many people hospitalized.    * MINOR community spread: low number of cases; not increasing; few or no people hospitalized      Possible community exposure at work  2. ONSET: \"When did the COVID-19 symptoms start?\"       1 day ago  3. WORST SYMPTOM: \"What is your worst symptom?\" (e.g., cough, fever, shortness of breath, muscle aches)      Sinus pressure and headache  4. COUGH: \"Do you have a cough?\" If so, ask: \"How bad is the cough?\"        Slight - smoker  5. FEVER: \"Do you have a fever?\" If so, ask: \"What is your temperature, how was it measured, and when did it start?\"      No   6. RESPIRATORY STATUS: \"Describe your breathing?\" (e.g., shortness of breath, wheezing, unable to speak)       no  7. BETTER-SAME-WORSE: \"Are you getting better, staying the same or getting worse compared to yesterday?\"  If getting worse, ask, \"In what way?\"      Staying the same since yesterday  8. HIGH RISK DISEASE: \"Do you have any chronic medical problems?\" (e.g., asthma, heart or lung disease, weak immune system, etc.)      no  9. PREGNANCY: \"Is there any chance you are pregnant?\" \"When was your last menstrual period?\"      no  10. OTHER SYMPTOMS: \"Do you have any other symptoms?\"  (e.g., runny nose, headache, sore throat, loss of smell)        Headache and sinus pressure, no runny nose    Answer Assessment - Initial Assessment Questions  1. LOCATION: \"Where does it hurt?\"       Pressure in head mandible up to ears and up to temple " "and back to ears  2. ONSET: \"When did the sinus pain start?\"  (e.g., hours, days)       24 hours ago  3. SEVERITY: \"How bad is the pain?\"   (Scale 1-10; mild, moderate or severe)    - MILD (1-3): doesn't interfere with normal activities     - MODERATE (4-7): interferes with normal activities (e.g., work or school) or awakens from sleep    - SEVERE (8-10): excruciating pain and patient unable to do any normal activities         5 or 6  4. RECURRENT SYMPTOM: \"Have you ever had sinus problems before?\" If so, ask: \"When was the last time?\" and \"What happened that time?\"       Some sinus problems in the winter  5. NASAL CONGESTION: \"Is the nose blocked?\" If so, ask, \"Can you open it or must you breathe through the mouth?\"      Yes, can breathe in but not out  6. NASAL DISCHARGE: \"Do you have discharge from your nose?\" If so ask, \"What color?\"      No, keeps trying to blow something out and is not getting anything  7. FEVER: \"Do you have a fever?\" If so, ask: \"What is it, how was it measured, and when did it start?\"       no  8. OTHER SYMPTOMS: \"Do you have any other symptoms?\" (e.g., sore throat, cough, earache, difficulty breathing)      Right ear aches a little, more pressure on right side of head as well  9. PREGNANCY: \"Is there any chance you are pregnant?\" \"When was your last menstrual period?\"      no    Protocols used: CORONAVIRUS (COVID-19) DIAGNOSED OR [id number] 4.22.20, SINUS PAIN OR CONGESTION-A-    Renu Philippe RN on 5/20/2020 at 4:32 PM    "

## 2023-04-01 ENCOUNTER — TRANSFERRED RECORDS (OUTPATIENT)
Dept: MULTI SPECIALTY CLINIC | Facility: CLINIC | Age: 28
End: 2023-04-01

## 2023-04-01 LAB — PAP SMEAR - HIM PATIENT REPORTED: NORMAL

## 2024-08-14 ENCOUNTER — OFFICE VISIT (OUTPATIENT)
Dept: FAMILY MEDICINE | Facility: CLINIC | Age: 29
End: 2024-08-14
Payer: COMMERCIAL

## 2024-08-14 VITALS
BODY MASS INDEX: 55.32 KG/M2 | SYSTOLIC BLOOD PRESSURE: 128 MMHG | WEIGHT: 293 LBS | OXYGEN SATURATION: 100 % | HEIGHT: 61 IN | DIASTOLIC BLOOD PRESSURE: 82 MMHG | HEART RATE: 95 BPM | TEMPERATURE: 98.4 F | RESPIRATION RATE: 22 BRPM

## 2024-08-14 DIAGNOSIS — M26.622 ARTHRALGIA OF LEFT TEMPOROMANDIBULAR JOINT: Primary | ICD-10-CM

## 2024-08-14 PROCEDURE — 99203 OFFICE O/P NEW LOW 30 MIN: CPT | Performed by: FAMILY MEDICINE
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
